# Patient Record
Sex: MALE | Race: WHITE | NOT HISPANIC OR LATINO | Employment: OTHER | URBAN - METROPOLITAN AREA
[De-identification: names, ages, dates, MRNs, and addresses within clinical notes are randomized per-mention and may not be internally consistent; named-entity substitution may affect disease eponyms.]

---

## 2017-03-08 RX ORDER — ATORVASTATIN CALCIUM 20 MG/1
TABLET, FILM COATED ORAL
Qty: 90 TABLET | Refills: 0 | Status: SHIPPED | OUTPATIENT
Start: 2017-03-08 | End: 2017-06-03 | Stop reason: SDUPTHER

## 2017-06-05 RX ORDER — ATORVASTATIN CALCIUM 20 MG/1
TABLET, FILM COATED ORAL
Qty: 30 TABLET | Refills: 0 | Status: SHIPPED | OUTPATIENT
Start: 2017-06-05 | End: 2017-07-05 | Stop reason: SDUPTHER

## 2017-06-27 ENCOUNTER — TELEPHONE (OUTPATIENT)
Dept: CARDIOLOGY | Facility: CLINIC | Age: 57
End: 2017-06-27

## 2017-06-28 DIAGNOSIS — E78.5 DYSLIPIDEMIA: Primary | ICD-10-CM

## 2017-07-05 RX ORDER — ATORVASTATIN CALCIUM 20 MG/1
TABLET, FILM COATED ORAL
Qty: 30 TABLET | Refills: 0 | Status: SHIPPED | OUTPATIENT
Start: 2017-07-05 | End: 2017-08-02 | Stop reason: SDUPTHER

## 2017-07-06 ENCOUNTER — LAB (OUTPATIENT)
Dept: LAB | Facility: HOSPITAL | Age: 57
End: 2017-07-06

## 2017-07-06 DIAGNOSIS — E78.5 DYSLIPIDEMIA: ICD-10-CM

## 2017-07-06 LAB
ALBUMIN SERPL-MCNC: 4.4 G/DL (ref 3.5–5.2)
ALP SERPL-CCNC: 106 U/L (ref 39–117)
ALT SERPL W P-5'-P-CCNC: 30 U/L (ref 1–41)
AST SERPL-CCNC: 26 U/L (ref 1–40)
BILIRUB CONJ SERPL-MCNC: <0.2 MG/DL (ref 0–0.3)
BILIRUB INDIRECT SERPL-MCNC: NORMAL MG/DL
BILIRUB SERPL-MCNC: 0.6 MG/DL (ref 0.1–1.2)
CHOLEST SERPL-MCNC: 135 MG/DL (ref 0–200)
HDLC SERPL-MCNC: 31 MG/DL (ref 40–60)
LDLC SERPL CALC-MCNC: 86 MG/DL (ref 0–100)
LDLC/HDLC SERPL: 2.76 {RATIO}
PROT SERPL-MCNC: 7 G/DL (ref 6–8.5)
TRIGL SERPL-MCNC: 92 MG/DL (ref 0–150)
VLDLC SERPL-MCNC: 18.4 MG/DL (ref 5–40)

## 2017-07-06 PROCEDURE — 80061 LIPID PANEL: CPT | Performed by: INTERNAL MEDICINE

## 2017-07-06 PROCEDURE — 80076 HEPATIC FUNCTION PANEL: CPT

## 2017-07-06 PROCEDURE — 36415 COLL VENOUS BLD VENIPUNCTURE: CPT

## 2017-08-02 RX ORDER — ATORVASTATIN CALCIUM 20 MG/1
TABLET, FILM COATED ORAL
Qty: 30 TABLET | Refills: 2 | Status: SHIPPED | OUTPATIENT
Start: 2017-08-02 | End: 2017-10-30 | Stop reason: SDUPTHER

## 2017-08-21 ENCOUNTER — OFFICE VISIT (OUTPATIENT)
Dept: ORTHOPEDIC SURGERY | Facility: CLINIC | Age: 57
End: 2017-08-21

## 2017-08-21 VITALS — TEMPERATURE: 98.5 F | HEIGHT: 68 IN

## 2017-08-21 DIAGNOSIS — M19.011 OSTEOARTHROSIS, LOCALIZED, PRIMARY, SHOULDER REGION, RIGHT: ICD-10-CM

## 2017-08-21 DIAGNOSIS — G89.29 CHRONIC PAIN OF RIGHT KNEE: Primary | ICD-10-CM

## 2017-08-21 DIAGNOSIS — M25.561 CHRONIC PAIN OF RIGHT KNEE: Primary | ICD-10-CM

## 2017-08-21 PROCEDURE — 73562 X-RAY EXAM OF KNEE 3: CPT | Performed by: ORTHOPAEDIC SURGERY

## 2017-08-21 PROCEDURE — 99203 OFFICE O/P NEW LOW 30 MIN: CPT | Performed by: ORTHOPAEDIC SURGERY

## 2017-08-21 RX ORDER — MELOXICAM 15 MG/1
TABLET ORAL
Qty: 30 TABLET | Refills: 3 | Status: SHIPPED | OUTPATIENT
Start: 2017-08-21 | End: 2017-11-01

## 2017-08-21 NOTE — PROGRESS NOTES
New Right Knee      Patient: Mega Mckeon        YOB: 1960    Medical Record Number: [id number]        Chief Complaints: Right Knee Pain  Chief Complaint   Patient presents with   • Right Knee - Establish Care           History of Present Illness: This is a  57 y.o. male who presents complaining of right knee pain.  He states his knees and bilateral him for a long time ago intermittently had a 3-4 days ago noticed the onset of significant swelling no real history injury change in activity that he can recall his symptoms are constant and moderate to severe with grinding aching clicking popping snapping.  He is self-employed past medical history is remarkable for cardiac disease        Allergies: No Known Allergies    Medications:   Home Medications:  Current Outpatient Prescriptions on File Prior to Visit   Medication Sig   • aspirin 325 MG tablet Take  by mouth daily.   • atorvastatin (LIPITOR) 20 MG tablet TAKE 1 TABLET BY MOUTH AT BEDTIME   • metoprolol tartrate (LOPRESSOR) 25 MG tablet TAKE 1/2 TABLET BY MOUTH DAILY   • ondansetron ODT (ZOFRAN ODT) 8 MG disintegrating tablet Take 1 tablet by mouth every 8 (eight) hours as needed for nausea or vomiting.   • oxyCODONE-acetaminophen (PERCOCET) 7.5-325 MG per tablet Take 1 tablet by mouth every 6 (six) hours as needed for moderate pain (4-6).   • tamsulosin (FLOMAX) 0.4 MG capsule 24 hr capsule Take 1 capsule by mouth daily.     No current facility-administered medications on file prior to visit.      Current Medications:  Scheduled Meds:  Continuous Infusions:  No current facility-administered medications for this visit.   PRN Meds:.    Past Medical History:   Diagnosis Date   • Arthritis    • CAD (coronary artery disease)    • Hyperlipidemia    • Ischemic cardiomyopathy    • Kidney stone    • Myocardial infarction     status: post drug eluting stent to the right coronary artery        Past Surgical History:   Procedure Laterality Date   •  "CORONARY ANGIOPLASTY WITH STENT PLACEMENT      RCA        Social History     Occupational History   • Not on file.     Social History Main Topics   • Smoking status: Never Smoker   • Smokeless tobacco: Not on file   • Alcohol use No   • Drug use: No   • Sexual activity: Defer    Social History     Social History Narrative        Family History   Problem Relation Age of Onset   • Diabetes Father              Review of Systems: 14 point review of systems are remarkable for the pertinent positives listed in the chart by the patient the remainder are negative    Review of Systems      Physical Exam: 57 y.o. male  General Appearance:    Alert, cooperative, in no acute distress                 Vitals:    08/21/17 1233   Temp: 98.5 °F (36.9 °C)   TempSrc: Temporal Artery    Height: 68\" (172.7 cm)      Patient is alert and read ×3 no acute distress appears her above-listed at height weight and age.  Affect is normal respiratory rate is normal unlabored. Heart rate regular rate rhythm, sclera, dentition and hearing are normal for the purpose of this exam.        Ortho Exam Physical exam of the right knee reveals no effusion, no erythema.  The patient has no palpable tenderness along the medial joint line, no tenderness about the lateral joint line.  Patient does have crepitus with patellofemoral range of motion.  They also have subjective symptoms anteriorly during exam.  The patient has a negative bounce home, negative Pawel and a stable ligamentous exam.  Quad tone is reasonable and symmetric.  There are no overlying skin changes no lymphedema no lymphadenopathy.  There is good hip range of motion which is full symmetric and asymptomatic and a normal ankle exam.  Hamstrings and IT band are tight bilaterally.        Procedures             Radiology:   AP, Lateral and merchant views of the right knee  were ordered/reviewed to evauateknee pain.  I've no comparative films.  Patella alter bilaterally and slightly lateral " riding patella with degenerative changes seen on lateral aspect patellofemoral joint.  His good manus were joint space medially and laterally      Assessment/Plan:  Right knee pain and swelling I really think this is more patellofemoral we talked about options we talked about injection he doesn't want to do that now.  We will start Mobic with strict precautions I will put him in a J brace talked about activities to avoid.  He fails to improve we will consider an injection in the future                                Thank you

## 2017-10-30 RX ORDER — ATORVASTATIN CALCIUM 20 MG/1
TABLET, FILM COATED ORAL
Qty: 30 TABLET | Refills: 0 | Status: SHIPPED | OUTPATIENT
Start: 2017-10-30 | End: 2017-11-28 | Stop reason: SDUPTHER

## 2017-11-01 ENCOUNTER — OFFICE VISIT (OUTPATIENT)
Dept: CARDIOLOGY | Facility: CLINIC | Age: 57
End: 2017-11-01

## 2017-11-01 VITALS
HEART RATE: 59 BPM | BODY MASS INDEX: 26.48 KG/M2 | WEIGHT: 185 LBS | HEIGHT: 70 IN | DIASTOLIC BLOOD PRESSURE: 78 MMHG | SYSTOLIC BLOOD PRESSURE: 140 MMHG

## 2017-11-01 DIAGNOSIS — I25.10 CORONARY ARTERY DISEASE INVOLVING NATIVE CORONARY ARTERY OF NATIVE HEART WITHOUT ANGINA PECTORIS: Primary | ICD-10-CM

## 2017-11-01 DIAGNOSIS — E78.2 MIXED HYPERLIPIDEMIA: ICD-10-CM

## 2017-11-01 PROCEDURE — 99214 OFFICE O/P EST MOD 30 MIN: CPT | Performed by: INTERNAL MEDICINE

## 2017-11-01 PROCEDURE — 93000 ELECTROCARDIOGRAM COMPLETE: CPT | Performed by: INTERNAL MEDICINE

## 2017-11-01 NOTE — PROGRESS NOTES
Date of Office Visit: 2017  Encounter Provider: Burak Carcamo MD  Place of Service: [facility] CARDIOLOGY  Patient Name: Mega Mckeon  :1960      Chief Complaint   Patient presents with   • Ischemic heart disease     History of Present Illness    The patient is a 57-year-old white male with a history of coronary artery disease.  He experienced an inferior wall myocardial infarction approximately 6 years ago.  A drug-eluting stent was placed to the right coronary artery.  He does not have any complaints of angina pectoris.  He denies lightheadedness, dizziness, orthopnea nor paroxysmal nocturnal dyspnea.  He has a history of hyperlipidemia as well he recently had his blood work done which essentially is unchanged from previous other than his LDL is a little higher in the 80s.  His liver functions however were normal.    Past Medical History:   Diagnosis Date   • Arthritis    • CAD (coronary artery disease)    • Hyperlipidemia    • Ischemic cardiomyopathy    • Kidney stone    • Myocardial infarction     status: post drug eluting stent to the right coronary artery         Past Surgical History:   Procedure Laterality Date   • CORONARY ANGIOPLASTY WITH STENT PLACEMENT      RCA           Current Outpatient Prescriptions:   •  aspirin 325 MG tablet, Take  by mouth daily., Disp: , Rfl:   •  atorvastatin (LIPITOR) 20 MG tablet, TAKE 1 TABLET BY MOUTH AT BEDTIME, Disp: 30 tablet, Rfl: 0  •  metoprolol tartrate (LOPRESSOR) 25 MG tablet, TAKE 1/2 TABLET BY MOUTH DAILY, Disp: 45 tablet, Rfl: 0      Social History     Social History   • Marital status:      Spouse name: N/A   • Number of children: N/A   • Years of education: N/A     Occupational History   • Not on file.     Social History Main Topics   • Smoking status: Never Smoker   • Smokeless tobacco: Not on file   • Alcohol use No   • Drug use: No   • Sexual activity: Defer     Other Topics Concern   • Not on  "file     Social History Narrative         Review of Systems   Constitution: Negative.   HENT: Negative.    Eyes: Negative.    Cardiovascular: Negative.    Respiratory: Negative.    Endocrine: Negative.    Skin: Negative.    Musculoskeletal: Negative.    Gastrointestinal: Negative.    Neurological: Negative.    Psychiatric/Behavioral: Negative.        Procedures      ECG 12 Lead  Date/Time: 11/1/2017 11:31 AM  Performed by: CHING GOLDSTEIN  Authorized by: CHING GOLDSTEIN   Comparison: compared with previous ECG from 4/20/2016  Similar to previous ECG  Rhythm: sinus rhythm  Rate: normal  Conduction: conduction normal  ST Segments: ST segments normal  T Waves: T waves normal  QRS axis: normal                 Objective:    /78  Pulse 59  Ht 70\" (177.8 cm)  Wt 185 lb (83.9 kg)  BMI 26.54 kg/m2        Physical Exam   Constitutional: He is oriented to person, place, and time. He appears well-developed and well-nourished.   HENT:   Head: Normocephalic.   Eyes: Pupils are equal, round, and reactive to light.   Neck: Normal range of motion. No JVD present. Carotid bruit is not present. No thyromegaly present.   Cardiovascular: Normal rate, regular rhythm, S1 normal, S2 normal, normal heart sounds and intact distal pulses.  Exam reveals no gallop and no friction rub.    No murmur heard.  Pulmonary/Chest: Effort normal and breath sounds normal.   Abdominal: Soft. Bowel sounds are normal.   Musculoskeletal: He exhibits no edema.   Neurological: He is alert and oriented to person, place, and time.   Skin: Skin is warm, dry and intact. No erythema.   Psychiatric: He has a normal mood and affect.   Vitals reviewed.          Assessment:       Diagnosis Plan   1. Coronary artery disease involving native coronary artery of native heart without angina pectoris     2. Mixed hyperlipidemia       Coronary Artery Disease  Assessment  • The patient has no angina    Plan  • Lifestyle modifications discussed include regular " exercise and regular monitoring of cholesterol and blood pressure    Subjective - Objective  • There is a history of past MI  • There has been a previous stent procedure using ELY RCA  • Current antiplatelet therapy includes aspirin 81 mg      Hyperlipidemia: Controlled on medical therapy     Plan:       Follow-up in one year

## 2017-11-06 ENCOUNTER — OFFICE VISIT (OUTPATIENT)
Dept: ORTHOPEDIC SURGERY | Facility: CLINIC | Age: 57
End: 2017-11-06

## 2017-11-06 VITALS — HEIGHT: 70 IN | WEIGHT: 185 LBS | TEMPERATURE: 98.5 F | BODY MASS INDEX: 26.48 KG/M2

## 2017-11-06 DIAGNOSIS — G89.29 CHRONIC PAIN OF RIGHT KNEE: Primary | ICD-10-CM

## 2017-11-06 DIAGNOSIS — S83.241D TEAR OF MEDIAL MENISCUS OF RIGHT KNEE, CURRENT, UNSPECIFIED TEAR TYPE, SUBSEQUENT ENCOUNTER: ICD-10-CM

## 2017-11-06 DIAGNOSIS — M25.561 CHRONIC PAIN OF RIGHT KNEE: Primary | ICD-10-CM

## 2017-11-06 PROCEDURE — 99212 OFFICE O/P EST SF 10 MIN: CPT | Performed by: ORTHOPAEDIC SURGERY

## 2017-11-06 NOTE — PROGRESS NOTES
"Knee f/u      Patient: Mega Mckeon        YOB: 1960            Chief Complaints: Knee pain right   History of Present Illness: patient is here follow-up right knee pain.  With done conservative care in the form of anti-inflammatories strengthening stretching and rest as well as ice.  He has had no lasting improvement in his symptoms      Physical Exam: 57 y.o. male  General Appearance:    Alert, cooperative, in no acute distress                   Vitals:    11/06/17 1221   Temp: 98.5 °F (36.9 °C)   TempSrc: Temporal Artery    Weight: 185 lb (83.9 kg)   Height: 70\" (177.8 cm)      Patient is alert and read ×3 no acute distress appears her above-listed at height weight and age.  Affect is normal respiratory rate is normal unlabored. Heart rate regular rate rhythm, sclera, dentition and hearing are normal for the purpose of this exam.  Exam and complaints are unchanged.  Physical exam of the right  knee reveals no effusion no redness.  The patient does have tenderness about the medial l joint line.  No tenderness about the lateral l joint line.  A negative bounce home and a positive l medial Pawel.    Patient has a stable ligamentous exam.  The patient has a negative Lachman and negative anterior drawer and a negative pivot shift.  Quads are reasonable and symmetric bilaterally.  Calf is soft and nontender.  There is no overlying skin changes no lymphedema lymphadenopathy.  Patient has good hip range of motion full symmetric and asymptomatic and a normal ankle exam.  She has good distal pulses and sensation distally is intact        Procedure:  Procedures          Assessment. Persistent knee pain that is failed conservative management      Plan: l.  History proceed with an MRI.  He can call after that test          "

## 2017-11-13 ENCOUNTER — APPOINTMENT (OUTPATIENT)
Dept: MRI IMAGING | Facility: HOSPITAL | Age: 57
End: 2017-11-13
Attending: ORTHOPAEDIC SURGERY

## 2017-11-14 DIAGNOSIS — G89.29 CHRONIC PAIN OF RIGHT KNEE: ICD-10-CM

## 2017-11-14 DIAGNOSIS — M25.561 CHRONIC PAIN OF RIGHT KNEE: ICD-10-CM

## 2017-11-14 DIAGNOSIS — S83.241D TEAR OF MEDIAL MENISCUS OF RIGHT KNEE, CURRENT, UNSPECIFIED TEAR TYPE, SUBSEQUENT ENCOUNTER: ICD-10-CM

## 2017-11-27 ENCOUNTER — OFFICE VISIT (OUTPATIENT)
Dept: ORTHOPEDIC SURGERY | Facility: CLINIC | Age: 57
End: 2017-11-27

## 2017-11-27 VITALS — TEMPERATURE: 98.4 F | BODY MASS INDEX: 27.4 KG/M2 | WEIGHT: 185 LBS | HEIGHT: 69 IN

## 2017-11-27 DIAGNOSIS — M25.561 CHRONIC PAIN OF RIGHT KNEE: Primary | ICD-10-CM

## 2017-11-27 DIAGNOSIS — G89.29 CHRONIC PAIN OF RIGHT KNEE: Primary | ICD-10-CM

## 2017-11-27 PROCEDURE — 20610 DRAIN/INJ JOINT/BURSA W/O US: CPT | Performed by: ORTHOPAEDIC SURGERY

## 2017-11-27 PROCEDURE — 99213 OFFICE O/P EST LOW 20 MIN: CPT | Performed by: ORTHOPAEDIC SURGERY

## 2017-11-27 RX ORDER — BUPIVACAINE HYDROCHLORIDE 5 MG/ML
4 INJECTION, SOLUTION EPIDURAL; INTRACAUDAL
Status: COMPLETED | OUTPATIENT
Start: 2017-11-27 | End: 2017-11-27

## 2017-11-27 RX ORDER — METHYLPREDNISOLONE ACETATE 80 MG/ML
80 INJECTION, SUSPENSION INTRA-ARTICULAR; INTRALESIONAL; INTRAMUSCULAR; SOFT TISSUE
Status: COMPLETED | OUTPATIENT
Start: 2017-11-27 | End: 2017-11-27

## 2017-11-27 RX ORDER — MELOXICAM 15 MG/1
TABLET ORAL
Refills: 3 | COMMUNITY
Start: 2017-11-17 | End: 2018-11-05

## 2017-11-27 RX ADMIN — BUPIVACAINE HYDROCHLORIDE 4 ML: 5 INJECTION, SOLUTION EPIDURAL; INTRACAUDAL at 13:37

## 2017-11-27 RX ADMIN — METHYLPREDNISOLONE ACETATE 80 MG: 80 INJECTION, SUSPENSION INTRA-ARTICULAR; INTRALESIONAL; INTRAMUSCULAR; SOFT TISSUE at 13:37

## 2017-11-27 NOTE — PROGRESS NOTES
"Knee MRI Follow Up      Patient: Mega Mckeon        YOB: 1960            Chief Complaints: Right Knee Pain.      History of Present Illness: The patient is here follow-up of an MRI of the knee MRI shows some degenerative degenerative changes in the patellofemoral joint no obvious meniscus tear still has some locking and catching      Physical Exam: 57 y.o. male  General Appearance:    Alert, cooperative, in no acute distress                   Vitals:    11/27/17 1317   Temp: 98.4 °F (36.9 °C)   Weight: 185 lb (83.9 kg)   Height: 69\" (175.3 cm)        Patient is alert and read ×3 no acute distress appears her above-listed at height weight and age.  Affect is normal respiratory rate is normal unlabored. Heart rate regular rate rhythm, sclera, dentition and hearing are normal for the purpose of this exam.      Ortho Exam  Physical exam of the right knee reveals no effusion, no erythema.  The patient has no palpable tenderness along the medial joint line, no tenderness about the lateral joint line.  Patient does have crepitus with patellofemoral range of motion.  They also have subjective symptoms anteriorly during exam.  The patient has a negative bounce home, negative Pawel and a stable ligamentous exam.  Quad tone is reasonable and symmetric.  There are no overlying skin changes no lymphedema no lymphadenopathy.  There is good hip range of motion which is full symmetric and asymptomatic and a normal ankle exam.  Hamstrings and IT band are tight bilaterally.        MRI Results are as above some patellofemoral OA I have reviewed the images and agree with the findings      :Large Joint Arthrocentesis  Date/Time: 11/27/2017 1:37 PM  Consent given by: patient  Site marked: site marked  Timeout: Immediately prior to procedure a time out was called to verify the correct patient, procedure, equipment, support staff and site/side marked as required   Supporting Documentation  Indications: pain "   Procedure Details  Location: knee - R knee  Needle size: 25 G  Approach: anteromedial  Medications administered: 80 mg methylPREDNISolone acetate 80 MG/ML; 4 mL bupivacaine (PF) 0.5 %  Patient tolerance: patient tolerated the procedure well with no immediate complications            Assessment/Plan:       right knee pain I still think this is more patellofemoral think is from his arthritis plan is proceed with an injection he fails to improve with that we will consider an arthroscopic evaluation

## 2017-11-28 RX ORDER — ATORVASTATIN CALCIUM 20 MG/1
TABLET, FILM COATED ORAL
Qty: 30 TABLET | Refills: 5 | Status: SHIPPED | OUTPATIENT
Start: 2017-11-28 | End: 2018-01-09 | Stop reason: SDUPTHER

## 2017-12-18 RX ORDER — MELOXICAM 15 MG/1
TABLET ORAL
Qty: 30 TABLET | Refills: 0 | Status: SHIPPED | OUTPATIENT
Start: 2017-12-18 | End: 2018-11-05

## 2018-01-09 RX ORDER — ATORVASTATIN CALCIUM 20 MG/1
20 TABLET, FILM COATED ORAL NIGHTLY
Qty: 90 TABLET | Refills: 1 | Status: SHIPPED | OUTPATIENT
Start: 2018-01-09 | End: 2018-06-21 | Stop reason: SDUPTHER

## 2018-06-21 RX ORDER — ATORVASTATIN CALCIUM 20 MG/1
TABLET, FILM COATED ORAL
Qty: 90 TABLET | Refills: 0 | Status: SHIPPED | OUTPATIENT
Start: 2018-06-21 | End: 2018-09-20 | Stop reason: SDUPTHER

## 2018-06-21 NOTE — TELEPHONE ENCOUNTER
Can you please place an order for Lipid panel & LFT, patient is due for lab work for his atorvastatin. Thanks. dmk

## 2018-09-20 RX ORDER — ATORVASTATIN CALCIUM 20 MG/1
20 TABLET, FILM COATED ORAL NIGHTLY
Qty: 90 TABLET | Refills: 0 | Status: SHIPPED | OUTPATIENT
Start: 2018-09-20 | End: 2018-11-06 | Stop reason: DRUGHIGH

## 2018-11-04 PROBLEM — M19.049 OSTEOARTHRITIS OF HAND: Status: ACTIVE | Noted: 2018-11-04

## 2018-11-04 PROBLEM — E78.5 HYPERLIPIDEMIA: Status: ACTIVE | Noted: 2018-11-04

## 2018-11-04 PROBLEM — I25.5 GENERALIZED ISCHEMIC MYOCARDIAL DYSFUNCTION: Status: ACTIVE | Noted: 2018-11-04

## 2018-11-04 PROBLEM — R07.9 CHEST PAIN: Status: ACTIVE | Noted: 2018-11-04

## 2018-11-04 PROBLEM — I25.10 CHRONIC CORONARY ARTERY DISEASE: Status: ACTIVE | Noted: 2018-11-04

## 2018-11-04 PROBLEM — M25.539 ARTHRALGIA OF WRIST: Status: ACTIVE | Noted: 2018-11-04

## 2018-11-04 PROBLEM — I25.10 CORONARY ARTERY DISEASE: Status: ACTIVE | Noted: 2018-11-04

## 2018-11-04 PROBLEM — R07.89 ATYPICAL CHEST PAIN: Status: ACTIVE | Noted: 2018-11-04

## 2018-11-04 NOTE — PROGRESS NOTES
Date of Office Visit: 2018  Encounter Provider: DEEJAY Pryor  Place of Service: [facility] CARDIOLOGY  Patient Name: Mega Mckeon  :1960      Subjective:     Chief Complaint:  CAD, Ischemic Cardiomyopathy    History of Present Illness:  Mega Mckeon is a pleasant 58 y.o. male who is new to me.  Outside records have been obtained and reviewed by me. The patient has a past medical history as outlined below.    The patient was last seen in the office on 17 by Dr. Carcamo. He has a prior cardiac history of an inferior MI with drug eluting stent placement to the RCA around . At his last visit with Dr. Carcamo he was not having any complaints of angina, lightheadedness, dizziness, orthopnea, or PND. It was reported that his recent lipid panels were essentially unchanged aside from his LDL was slightly higher from previous- with readings in the 80s. His blood pressure was reasonably controlled as well. Dr. Carcamo did not make any medication changes and continued him on aspirin 81 mg and advised that he follow back up in 1 year. It was reported at his 2016 visit with Dr. 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 It was reported that the patient would need an echocardiogram at that time, but I do not see any results for this.     He is here today for his yearly follow up.  Overall he is doing well.  He denies any chest pain, shortness of breath, palpitations, edema, dizziness, lightheadedness, syncope, near-syncope, PND or orthopnea.  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  He does not have any symptoms at work.  He does get a little fatigued with work but nothing that is limiting to him.  He also reports some occasional heartburn that is worse when he drinks more Mountain Dew.  He reports it definitely improves if he  limits his caffeine intake.  He does not check his blood pressure or heart rate at home, but his blood pressure is well controlled today at 118/64.  He reportshe just takes 12.5 mg of metoprolol tartrate daily.  He was taking 25 mg of metoprolol tartrate daily before was cut in half.  He has been doing this for several years and has never taken it twice daily.  He has not had a recent lipid panel, although one was ordered by our office in June 2018. He does not have a PCP.  He states he has tried several in the past and always has to wait a long time and is never found one that he likes.  He does follow with urology yearly and has had a screening colonoscopy last year.         Past Medical History:   Diagnosis Date   • Arthritis    • CAD (coronary artery disease)    • Hyperlipidemia    • Ischemic cardiomyopathy    • Kidney stone    • Myocardial infarction (CMS/HCC)     status: post drug eluting stent to the right coronary artery     Past Surgical History:   Procedure Laterality Date   • CORONARY ANGIOPLASTY WITH STENT PLACEMENT      RCA     Outpatient Medications Prior to Visit   Medication Sig Dispense Refill   • atorvastatin (LIPITOR) 20 MG tablet Take 1 tablet by mouth Every Night. NEEDS CHOLESTEROL LABS 90 tablet 0   • metoprolol tartrate (LOPRESSOR) 25 MG tablet TAKE ONE-HALF (1/2) TABLET DAILY 45 tablet 1   • aspirin 325 MG tablet Take  by mouth daily.     • meloxicam (MOBIC) 15 MG tablet TK 1 T PO D WF  3   • meloxicam (MOBIC) 15 MG tablet TAKE 1 TABLET BY MOUTH DAILY WITH FOOD 30 tablet 0   • metoprolol tartrate (LOPRESSOR) 25 MG tablet TAKE 1/2 TABLET BY MOUTH DAILY 45 tablet 2     No facility-administered medications prior to visit.        Allergies as of 11/05/2018   • (No Known Allergies)     Social History     Social History   • Marital status:      Spouse name: N/A   • Number of children: N/A   • Years of education: N/A     Occupational History   • Not on file.     Social History Main Topics   •  "Smoking status: Never Smoker   • Smokeless tobacco: Never Used   • Alcohol use No   • Drug use: No   • Sexual activity: Defer     Other Topics Concern   • Not on file     Social History Narrative   • No narrative on file     Family History   Problem Relation Age of Onset   • Diabetes Father      Review of Systems   Constitution: Negative for chills, fever and malaise/fatigue.   HENT: Negative for ear pain, hearing loss, nosebleeds and sore throat.    Eyes: Negative for double vision, pain, vision loss in left eye and vision loss in right eye.   Cardiovascular: Negative for chest pain, claudication, dyspnea on exertion, irregular heartbeat, leg swelling, near-syncope, orthopnea, palpitations, paroxysmal nocturnal dyspnea and syncope.   Respiratory: Negative for cough, shortness of breath, snoring and wheezing.    Endocrine: Negative for cold intolerance and heat intolerance.   Hematologic/Lymphatic: Negative for bleeding problem.   Skin: Negative for color change, itching, rash and unusual hair distribution.   Musculoskeletal: Positive for joint pain. Negative for joint swelling.   Gastrointestinal: Negative for abdominal pain, diarrhea, hematochezia, melena, nausea and vomiting.   Genitourinary: Negative for decreased libido, frequency, hematuria, hesitancy and incomplete emptying.   Neurological: Negative for excessive daytime sleepiness, dizziness, headaches, light-headedness, loss of balance, numbness, paresthesias and seizures.   Psychiatric/Behavioral: Negative for depression.          Objective:     Vitals:    11/05/18 0846   BP: 118/64   BP Location: Right arm   Patient Position: Sitting   Pulse: 56   Weight: 82.6 kg (182 lb)   Height: 175.3 cm (69\")     Body mass index is 26.88 kg/m².    PHYSICAL EXAM:  Physical Exam   Constitutional: He is oriented to person, place, and time. He appears well-developed and well-nourished. No distress.   Overweight   HENT:   Head: Normocephalic and atraumatic.   Eyes: Pupils " are equal, round, and reactive to light.   Neck: Phonation normal. Neck supple. No JVD present. Carotid bruit is not present.   Cardiovascular: Regular rhythm, normal heart sounds and intact distal pulses.  Bradycardia present.    No murmur heard.  Pulses:       Radial pulses are 2+ on the right side, and 2+ on the left side.        Dorsalis pedis pulses are 2+ on the right side, and 2+ on the left side.        Posterior tibial pulses are 2+ on the right side, and 2+ on the left side.   Pulmonary/Chest: Effort normal and breath sounds normal. No accessory muscle usage. No respiratory distress. He has no decreased breath sounds. He has no wheezes. He has no rhonchi. He has no rales.   Abdominal: Soft. Bowel sounds are normal. He exhibits no distension. There is no splenomegaly or hepatomegaly. There is no tenderness.   Musculoskeletal: Normal range of motion. He exhibits no edema.   Neurological: He is alert and oriented to person, place, and time.   Skin: Skin is warm, dry and intact. He is not diaphoretic. No erythema.   Psychiatric: He has a normal mood and affect. His speech is normal and behavior is normal. Judgment and thought content normal. Cognition and memory are normal.   Nursing note and vitals reviewed.        ECG 12 Lead  Date/Time: 11/5/2018 9:04 AM  Performed by: ANNELIESE SANTANA  Authorized by: ANNELIESE SANTANA   Comparison: compared with previous ECG from 11/1/2017  Similar to previous ECG  Comparison to previous ECG: New isolated Q wave in lead III  Rhythm: sinus bradycardia  Rate: bradycardic  BPM: 59  T flattening: III  QRS axis: normal  Comments: Otherwise normal ECG  Indication: CAD            Assessment:       Diagnosis Plan   1. Coronary artery disease involving native coronary artery of native heart without angina pectoris  ECG 12 Lead    Lipid Panel    Hepatic Function Panel   2. Other hyperlipidemia  ECG 12 Lead    Lipid Panel    Hepatic Function Panel       Plan:     1.  Coronary Artery  Disease  Plan  • Lifestyle modifications discussed include adhering to a heart healthy diet, avoidance of tobacco products, maintenance of a healthy weight, medication compliance, regular exercise and regular monitoring of cholesterol and blood pressure    Subjective - Objective  • There is a history of past MI  • There has been a previous stent procedure using ELY      The patient has no angina or heart failure symptoms.  He is on aspirin 325 mg daily.  I will have him take aspirin 81 mg daily instead.  He is also taking metoprolol tartrate 12.5 mg daily.  He has never been on twice a day dosing.  I discussed with Dr. Carcamo and decided that he is fine to continue daily dosing since he is doing well with that for several years now, will go to BID dosing in the future if needed.  He does not check his HR or b/p at home.  I advised him to get a blood pressure cuff and it periodically check his blood pressures/HR at home.  He demonstrated understanding. I do not think at this time the patient needs any additional cardiac testing.     2. Hyperlipidemia: On Atorvastatin 20 mg daily. If his LDL continues to stay above 80 with his next lipid panel, would recommend increasing his Atorvastatin to 40 mg daily if his  LFTs remain WNL. An order was given to him June 2018 for a fasting lipid panel. I do not see where this was complete.  I gave the patient a new order for fasting lipid panel and hepatic function panel to be completed in the next couple weeks. I advised the patient that if they have not heard from our office within 2-3 days after they have completed their testing to please call our office to obtain their results.      I advised on the importance of good blood pressure, blood sugar and cholesterol control, as well as regular exercise and weight loss and avoidance of tobacco for cardiovascular disease risk factor modification.  I also advised him on the importance of Seeing care with a primary care physician.  I  gave him a list of [facility] providers broken down by area of [facility]. He demonstrated understanding.           Follow up with Dr. Carcamo in 1 year, unless otherwise needed sooner.  I advised the patient to contact our office with any questions or concerns.         Your medication list          Accurate as of 11/5/18  9:33 AM. If you have any questions, ask your nurse or doctor.               CHANGE how you take these medications      Instructions Last Dose Given Next Dose Due   aspirin 81 MG tablet  What changed:  · medication strength  · how much to take  Changed by:  DEEJAY Pryor      Take 1 tablet by mouth Daily.       metoprolol tartrate 25 MG tablet  Commonly known as:  LOPRESSOR  What changed:  Another medication with the same name was removed. Continue taking this medication, and follow the directions you see here.  Changed by:  DEEJAY Pryor      TAKE ONE-HALF (1/2) TABLET DAILY          CONTINUE taking these medications      Instructions Last Dose Given Next Dose Due   atorvastatin 20 MG tablet  Commonly known as:  LIPITOR      Take 1 tablet by mouth Every Night. NEEDS CHOLESTEROL LABS          STOP taking these medications    meloxicam 15 MG tablet  Commonly known as:  MOBIC  Stopped by:  DEEJAY Pryor              Where to Get Your Medications      Information about where to get these medications is not yet available    Ask your nurse or doctor about these medications  · aspirin 81 MG tablet         The above medication changes may not have been made by this provider.  Medication list was updated to reflect medications patient is currently taking including medication changes and discontinuations made by other healthcare providers.       I have reviewed this case with Dr. Carcamo. It has been a pleasure to participate in this patient's care. Please feel free to contact me with any questions or concerns.     DEEJAY Pryor  11/05/2018       Dictated utilizing Dragon  Dictation System.

## 2018-11-04 NOTE — PROGRESS NOTES
The patient was last seen in the office on 11/1/17 by Dr. Carcamo. He has a prior cardiac history of an inferior MI with stenting to the RCA

## 2018-11-05 ENCOUNTER — OFFICE VISIT (OUTPATIENT)
Dept: CARDIOLOGY | Facility: CLINIC | Age: 58
End: 2018-11-05

## 2018-11-05 VITALS
HEIGHT: 69 IN | HEART RATE: 56 BPM | DIASTOLIC BLOOD PRESSURE: 64 MMHG | WEIGHT: 182 LBS | SYSTOLIC BLOOD PRESSURE: 118 MMHG | BODY MASS INDEX: 26.96 KG/M2

## 2018-11-05 DIAGNOSIS — E78.49 OTHER HYPERLIPIDEMIA: ICD-10-CM

## 2018-11-05 DIAGNOSIS — I25.10 CORONARY ARTERY DISEASE INVOLVING NATIVE CORONARY ARTERY OF NATIVE HEART WITHOUT ANGINA PECTORIS: Primary | ICD-10-CM

## 2018-11-05 PROCEDURE — 93000 ELECTROCARDIOGRAM COMPLETE: CPT | Performed by: NURSE PRACTITIONER

## 2018-11-05 PROCEDURE — 99214 OFFICE O/P EST MOD 30 MIN: CPT | Performed by: NURSE PRACTITIONER

## 2018-11-06 ENCOUNTER — TELEPHONE (OUTPATIENT)
Dept: CARDIOLOGY | Facility: CLINIC | Age: 58
End: 2018-11-06

## 2018-11-06 ENCOUNTER — LAB (OUTPATIENT)
Dept: LAB | Facility: HOSPITAL | Age: 58
End: 2018-11-06

## 2018-11-06 DIAGNOSIS — E78.49 OTHER HYPERLIPIDEMIA: ICD-10-CM

## 2018-11-06 DIAGNOSIS — E78.5 HYPERLIPIDEMIA, UNSPECIFIED HYPERLIPIDEMIA TYPE: Primary | ICD-10-CM

## 2018-11-06 DIAGNOSIS — I25.10 CORONARY ARTERY DISEASE INVOLVING NATIVE CORONARY ARTERY OF NATIVE HEART WITHOUT ANGINA PECTORIS: ICD-10-CM

## 2018-11-06 LAB
ALBUMIN SERPL-MCNC: 4.3 G/DL (ref 3.5–5.2)
ALP SERPL-CCNC: 108 U/L (ref 39–117)
ALT SERPL W P-5'-P-CCNC: 34 U/L (ref 1–41)
AST SERPL-CCNC: 31 U/L (ref 1–40)
BILIRUB CONJ SERPL-MCNC: <0.2 MG/DL (ref 0–0.3)
BILIRUB INDIRECT SERPL-MCNC: NORMAL MG/DL
BILIRUB SERPL-MCNC: 0.8 MG/DL (ref 0.1–1.2)
CHOLEST SERPL-MCNC: 129 MG/DL (ref 0–200)
HDLC SERPL-MCNC: 34 MG/DL (ref 40–60)
LDLC SERPL CALC-MCNC: 81 MG/DL (ref 0–100)
LDLC/HDLC SERPL: 2.39 {RATIO}
PROT SERPL-MCNC: 7.2 G/DL (ref 6–8.5)
TRIGL SERPL-MCNC: 68 MG/DL (ref 0–150)
VLDLC SERPL-MCNC: 13.6 MG/DL (ref 5–40)

## 2018-11-06 PROCEDURE — 80061 LIPID PANEL: CPT

## 2018-11-06 PROCEDURE — 80076 HEPATIC FUNCTION PANEL: CPT

## 2018-11-06 PROCEDURE — 36415 COLL VENOUS BLD VENIPUNCTURE: CPT

## 2018-11-06 RX ORDER — ATORVASTATIN CALCIUM 40 MG/1
40 TABLET, FILM COATED ORAL DAILY
Qty: 30 TABLET | Refills: 2
Start: 2018-11-06 | End: 2018-12-19 | Stop reason: SDUPTHER

## 2018-11-06 NOTE — TELEPHONE ENCOUNTER
I called and spoke with the patient regarding his cholesterol levels.  His LDL is still in the 80s.  I will have him double up on his atorvastatin dose and take a total of 40 mg daily.  He will recheck his fasting lipid panel and hepatic function panel in 3 months.  Currently he does not need any refills of his Atorvastatin.  He states he's been using a mail order pharmacy  andis unsure if it is [facility] as listed in the computer or not.  He reports he will get back to our office before he runs out of the atorvastatin 20 mg tablets so we can refill it with the atorvastatin 40 mg.

## 2018-11-13 ENCOUNTER — TELEPHONE (OUTPATIENT)
Dept: CARDIOLOGY | Facility: CLINIC | Age: 58
End: 2018-11-13

## 2018-11-13 NOTE — TELEPHONE ENCOUNTER
He called just to let you know he does use [facility].    Pt Number: [phone]    Thank you   Karolyn

## 2018-12-19 RX ORDER — ATORVASTATIN CALCIUM 40 MG/1
40 TABLET, FILM COATED ORAL DAILY
Qty: 90 TABLET | Refills: 3 | Status: SHIPPED | OUTPATIENT
Start: 2018-12-19 | End: 2020-01-13

## 2019-01-02 ENCOUNTER — HOSPITAL ENCOUNTER (OUTPATIENT)
Dept: GENERAL RADIOLOGY | Facility: HOSPITAL | Age: 59
Discharge: HOME | End: 2019-01-02
Attending: UROLOGY | Admitting: UROLOGY

## 2019-01-02 DIAGNOSIS — N13.8 BPH WITH OBSTRUCTION/LOWER URINARY TRACT SYMPTOMS: ICD-10-CM

## 2019-01-02 DIAGNOSIS — N40.1 BPH WITH OBSTRUCTION/LOWER URINARY TRACT SYMPTOMS: ICD-10-CM

## 2019-01-02 PROCEDURE — 74018 RADEX ABDOMEN 1 VIEW: CPT

## 2019-02-27 DIAGNOSIS — E78.5 HYPERLIPIDEMIA, UNSPECIFIED HYPERLIPIDEMIA TYPE: Primary | ICD-10-CM

## 2019-10-09 ENCOUNTER — OFFICE VISIT (OUTPATIENT)
Dept: CARDIOLOGY | Facility: CLINIC | Age: 59
End: 2019-10-09

## 2019-10-09 ENCOUNTER — LAB (OUTPATIENT)
Dept: LAB | Facility: HOSPITAL | Age: 59
End: 2019-10-09

## 2019-10-09 VITALS
HEART RATE: 55 BPM | WEIGHT: 182.6 LBS | SYSTOLIC BLOOD PRESSURE: 124 MMHG | OXYGEN SATURATION: 99 % | DIASTOLIC BLOOD PRESSURE: 86 MMHG | BODY MASS INDEX: 26.14 KG/M2 | HEIGHT: 70 IN

## 2019-10-09 DIAGNOSIS — I25.10 CORONARY ARTERY DISEASE INVOLVING NATIVE CORONARY ARTERY OF NATIVE HEART WITHOUT ANGINA PECTORIS: Primary | ICD-10-CM

## 2019-10-09 DIAGNOSIS — E78.49 OTHER HYPERLIPIDEMIA: ICD-10-CM

## 2019-10-09 LAB
ALBUMIN SERPL-MCNC: 4.7 G/DL (ref 3.5–5.2)
ALP SERPL-CCNC: 110 U/L (ref 39–117)
ALT SERPL W P-5'-P-CCNC: 33 U/L (ref 1–41)
AST SERPL-CCNC: 27 U/L (ref 1–40)
BILIRUB CONJ SERPL-MCNC: 0.2 MG/DL (ref 0.2–0.3)
BILIRUB INDIRECT SERPL-MCNC: 0.5 MG/DL
BILIRUB SERPL-MCNC: 0.7 MG/DL (ref 0.2–1.2)
CHOLEST SERPL-MCNC: 123 MG/DL (ref 0–200)
HDLC SERPL-MCNC: 32 MG/DL (ref 40–60)
LDLC SERPL CALC-MCNC: 69 MG/DL (ref 0–100)
LDLC/HDLC SERPL: 2.17 {RATIO}
PROT SERPL-MCNC: 7.2 G/DL (ref 6–8.5)
TRIGL SERPL-MCNC: 108 MG/DL (ref 0–150)
VLDLC SERPL-MCNC: 21.6 MG/DL (ref 5–40)

## 2019-10-09 PROCEDURE — 80076 HEPATIC FUNCTION PANEL: CPT

## 2019-10-09 PROCEDURE — 99214 OFFICE O/P EST MOD 30 MIN: CPT | Performed by: INTERNAL MEDICINE

## 2019-10-09 PROCEDURE — 93000 ELECTROCARDIOGRAM COMPLETE: CPT | Performed by: INTERNAL MEDICINE

## 2019-10-09 PROCEDURE — 80061 LIPID PANEL: CPT | Performed by: INTERNAL MEDICINE

## 2019-10-09 PROCEDURE — 36415 COLL VENOUS BLD VENIPUNCTURE: CPT

## 2019-10-09 NOTE — PROGRESS NOTES
Date of Office Visit: 10/09/2019    Patient Name: Mega Mckeon  : 1960    Encounter Provider: Burak Carcamo MD  Referring Provider: Burak Carcamo MD  Place of Service: [facility] CARDIOLOGY  Patient Care Team:  Provider, No Known as PCP - General      Chief Complaint   Patient presents with   • Coronary Artery Disease     1 yr follow up     History of Present Illness    The patient is a 59-year-old white male with a history of coronary artery disease.  He has had a previous inferior myocardial infarction with a drug-eluting stent placed to the right coronary artery.  Left ventricular function is normal.  Last stress test was normal.  Patient reports no symptoms at all at this time.  He denies chest pain, shortness of breath, lightheadedness, dizziness, orthopnea or paroxysmal nocturnal dyspnea.    He remains on statin therapy for hyperlipidemia.  His last check was a year ago.  LDL was 81, HDL was 34.    Past Medical History:   Diagnosis Date   • Arthritis    • CAD (coronary artery disease)    • Hyperlipidemia    • Ischemic cardiomyopathy    • Kidney stone    • Myocardial infarction (CMS/HCC)     status: post drug eluting stent to the right coronary artery         Past Surgical History:   Procedure Laterality Date   • CORONARY ANGIOPLASTY WITH STENT PLACEMENT      RCA           Current Outpatient Medications:   •  aspirin 81 MG tablet, Take 1 tablet by mouth Daily., Disp: 30 tablet, Rfl: 11  •  atorvastatin (LIPITOR) 40 MG tablet, Take 1 tablet by mouth Daily., Disp: 90 tablet, Rfl: 3  •  metoprolol tartrate (LOPRESSOR) 25 MG tablet, Take 0.5 tablets by mouth Daily., Disp: 45 tablet, Rfl: 3      Social History     Socioeconomic History   • Marital status:      Spouse name: Not on file   • Number of children: Not on file   • Years of education: Not on file   • Highest education level: Not on file   Tobacco Use   • Smoking status: Never Smoker   •  "Smokeless tobacco: Never Used   • Tobacco comment: caffeine use   Substance and Sexual Activity   • Alcohol use: No   • Drug use: No   • Sexual activity: Defer         Review of Systems   Constitution: Negative.   HENT: Negative.    Eyes: Negative.    Cardiovascular: Negative.    Respiratory: Negative.    Endocrine: Negative.    Skin: Negative.    Musculoskeletal: Negative.    Gastrointestinal: Negative.    Neurological: Negative.    Psychiatric/Behavioral: Negative.        Procedures      ECG 12 Lead  Date/Time: 10/9/2019 10:29 AM  Performed by: Burak Carcamo MD  Authorized by: Burak Carcamo MD   Comparison: compared with previous ECG from 11/5/2018  Similar to previous ECG  Rhythm: sinus rhythm  Rate: normal  Conduction: conduction normal  ST Segments: ST segments normal  T Waves: T waves normal  QRS axis: normal                  Objective:    /86 (BP Location: Left arm, Patient Position: Sitting, Cuff Size: Adult)   Pulse 55   Ht 177.8 cm (70\")   Wt 82.8 kg (182 lb 9.6 oz)   SpO2 99%   BMI 26.20 kg/m²         Physical Exam   Constitutional: He is oriented to person, place, and time. He appears well-developed and well-nourished.   HENT:   Head: Normocephalic.   Eyes: Pupils are equal, round, and reactive to light.   Neck: Normal range of motion. No JVD present. Carotid bruit is not present. No thyromegaly present.   Cardiovascular: Normal rate, regular rhythm, S1 normal, S2 normal, normal heart sounds and intact distal pulses. Exam reveals no gallop and no friction rub.   No murmur heard.  Pulmonary/Chest: Effort normal and breath sounds normal.   Abdominal: Soft. Bowel sounds are normal.   Musculoskeletal: He exhibits no edema.   Neurological: He is alert and oriented to person, place, and time.   Skin: Skin is warm, dry and intact. No erythema.   Psychiatric: He has a normal mood and affect.   Vitals reviewed.          Assessment:       Diagnosis Plan   1. Coronary artery disease " involving native coronary artery of native heart without angina pectoris     2. Other hyperlipidemia  Lipid Panel    Hepatic Function Panel     1. Coronary Artery Disease  Assessment  • The patient has no angina    Plan  • Lifestyle modifications discussed include adhering to a heart healthy diet, maintenance of a healthy weight and regular exercise    Subjective - Objective  • There is a history of past MI  • There has been a previous stent procedure using ELY RCA  • Current antiplatelet therapy includes aspirin 81 mg      2.  Hyperlipidemia: Recheck lipid panel at this time     Plan:

## 2020-01-13 RX ORDER — ATORVASTATIN CALCIUM 40 MG/1
40 TABLET, FILM COATED ORAL DAILY
Qty: 90 TABLET | Refills: 3 | Status: SHIPPED | OUTPATIENT
Start: 2020-01-13 | End: 2020-11-03

## 2020-03-17 ENCOUNTER — OFFICE VISIT (OUTPATIENT)
Dept: ORTHOPEDIC SURGERY | Facility: CLINIC | Age: 60
End: 2020-03-17

## 2020-03-17 VITALS — TEMPERATURE: 98.1 F | HEIGHT: 69 IN | WEIGHT: 181.4 LBS | BODY MASS INDEX: 26.87 KG/M2

## 2020-03-17 DIAGNOSIS — M79.671 PAIN OF RIGHT HEEL: Primary | ICD-10-CM

## 2020-03-17 DIAGNOSIS — M72.2 PLANTAR FASCIITIS: ICD-10-CM

## 2020-03-17 PROCEDURE — 73650 X-RAY EXAM OF HEEL: CPT | Performed by: ORTHOPAEDIC SURGERY

## 2020-03-17 PROCEDURE — 99213 OFFICE O/P EST LOW 20 MIN: CPT | Performed by: ORTHOPAEDIC SURGERY

## 2020-03-17 RX ORDER — MELOXICAM 15 MG/1
TABLET ORAL
Qty: 30 TABLET | Refills: 0 | Status: SHIPPED | OUTPATIENT
Start: 2020-03-17 | End: 2020-08-24

## 2020-03-17 NOTE — PROGRESS NOTES
New right heel      Patient: Mega Mckeon        YOB: 1960        Chief Complaints: right heel pain      History of Present Illness: This is a 60-year-old male he is Daphne Sunshine's brother who presents complaining of right heel pain is been ongoing a couple of months.  He does state he has been in some different work boots and is not sure if that is what it is he did change his boots again and that has helped he has pain if he is up on pain by the end of the day symptoms are moderate intermittent aching with swelling worse with standing sitting driving walking better with rest he is self-employed past medical history is unremarkable    HPI      Allergies: No Known Allergies    Medications:   Home Medications:  Current Outpatient Medications on File Prior to Visit   Medication Sig   • aspirin 81 MG tablet Take 1 tablet by mouth Daily.   • atorvastatin (LIPITOR) 40 MG tablet TAKE 1 TABLET BY MOUTH DAILY   • metoprolol tartrate (LOPRESSOR) 25 MG tablet TAKE 1/2 TABLET BY MOUTH DAILY     No current facility-administered medications on file prior to visit.      Current Medications:  Scheduled Meds:  Continuous Infusions:  No current facility-administered medications for this visit.   PRN Meds:.    Past Medical History:   Diagnosis Date   • Arthritis    • CAD (coronary artery disease)    • Hyperlipidemia    • Ischemic cardiomyopathy    • Kidney stone    • Myocardial infarction (CMS/HCC)     status: post drug eluting stent to the right coronary artery        Past Surgical History:   Procedure Laterality Date   • CORONARY ANGIOPLASTY WITH STENT PLACEMENT      RCA        Social History     Occupational History   • Not on file   Tobacco Use   • Smoking status: Never Smoker   • Smokeless tobacco: Never Used   • Tobacco comment: caffeine use   Substance and Sexual Activity   • Alcohol use: No   • Drug use: No   • Sexual activity: Defer    Social History     Social History Narrative   • Not on file         Family History   Problem Relation Age of Onset   • Diabetes Father              Review of Systems: 14 point review of systems are remarkable for the heel pain only the remainder negative per the patient    Review of Systems      Physical Exam: 60 y.o. male  General Appearance:    Alert, cooperative, in no acute distress                 There were no vitals filed for this visit.   Patient is alert and read ×3 no acute distress appears her above-listed at height weight and age.  Affect is normal respiratory rate is normal unlabored. Heart rate regular rate rhythm, sclera, dentition and hearing are normal for the purpose of this exam.        Ortho Exam's exam of the right heel he has tenderness over the origin of plantar fascia just posterior to the arch he does have some tightness in his heel cords which is symmetric bilaterally posterior tib is intact no overlying skin changes no swelling           Radiology:   AP, and axial view of the right heel ordered/reviewed to evaluate pain. I have no comparative films these are normal I see no evidence of any acute bony pathology  Assessment/Plan:    Right heel pain I think this is plantar fasciitis which I discussed with her in detail I think stretching is key which I showed him how to do we talked about inserts which he has at home and he has changed his boots.  He will try this if it fails to improve we will pursue dedicated physical therapy if that fails would pursue other means of testing and/or referral to Dr. Marie

## 2020-08-23 PROBLEM — I25.10 CHRONIC CORONARY ARTERY DISEASE: Status: RESOLVED | Noted: 2018-11-04 | Resolved: 2020-08-23

## 2020-08-23 PROBLEM — Z76.89 ENCOUNTER TO ESTABLISH CARE: Status: ACTIVE | Noted: 2020-08-23

## 2020-08-23 PROBLEM — I10 ESSENTIAL HYPERTENSION: Status: ACTIVE | Noted: 2020-08-23

## 2020-08-23 NOTE — PROGRESS NOTES
Name: Mega Mckeon  :  1960    Subjective:      Chief Complaint   Patient presents with   • Establish Care     Pt presents here today to establish care.   • Hypertension   • Hyperlipidemia        Mega Mckeon is a 60 y.o. male patient.  He has multiple chronic medical conditions including:   Hypertension, coronary artery disease (inferior MI ELY R coronary artery ), hyperlipidemia, OA (stable - no longer on nsaids), (prior kidney stone - last FU with [facility] 2020 - negative for stones)     He was a prior patient of Dr Carroll.   He is new to me. He is here to establish care.   No complaints     Hypertension   This is a chronic problem. The current episode started more than 1 year ago. The problem is controlled. Pertinent negatives include no chest pain, headaches, palpitations, peripheral edema or shortness of breath. There are no associated agents to hypertension. Risk factors for coronary artery disease include male gender and dyslipidemia. Past treatments include beta blockers. Current antihypertension treatment includes beta blockers. There are no compliance problems.  Hypertensive end-organ damage includes CAD/MI.   Hyperlipidemia   This is a chronic problem. Episode onset: . The problem is controlled. Recent lipid tests were reviewed and are normal. Factors aggravating his hyperlipidemia include beta blockers. Pertinent negatives include no chest pain or shortness of breath. Current antihyperlipidemic treatment includes statins. The current treatment provides significant improvement of lipids. There are no compliance problems.  Risk factors for coronary artery disease include hypertension, male sex and dyslipidemia.   Coronary Artery Disease   Presents for follow-up (MI  ) visit. Pertinent negatives include no chest pain, leg swelling, palpitations or shortness of breath. Risk factors include hyperlipidemia. The symptoms have been stable. Compliance with  diet is variable. Compliance with exercise is good. Compliance with medications is good.       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       I have reviewed the patient's medical history in detail and updated the computerized patient record.    Past Medical History:   Diagnosis Date   • Arthritis    • CAD (coronary artery disease)    • Hyperlipidemia    • Ischemic cardiomyopathy    • Kidney stone    • Myocardial infarction (CMS/HCC)     status: post drug eluting stent to the right coronary artery       Past Surgical History:   Procedure Laterality Date   • COLONOSCOPY  05/08/2015    Dr Stevens    • CORONARY ANGIOPLASTY WITH STENT PLACEMENT  02/11/2011    [facility]CA       Family History   Problem Relation Age of Onset   • Diabetes Father        Social History     Socioeconomic History   • Marital status:      Spouse name: Not on file   • Number of children: 1   • Years of education: Not on file   • Highest education level: Not on file   Tobacco Use   • Smoking status: Never Smoker   • Smokeless tobacco: Never Used   • Tobacco comment: caffeine use   Substance and Sexual Activity   • Alcohol use: No   • Drug use: No   • Sexual activity: Defer   Social History Narrative    Self employed - works in concrete        There is no immunization history for the selected administration types on file for this patient.      Review of Systems:   Review of Systems   Constitutional: Negative for chills, fever and unexpected weight change.   Eyes: Negative.    Respiratory: Negative for shortness of breath.    Cardiovascular: Negative for chest pain, palpitations and leg swelling.   Gastrointestinal: Negative for abdominal pain, anal bleeding and blood in stool.   Endocrine: Negative.    Genitourinary: Negative for difficulty urinating.   Musculoskeletal: Negative.    Allergic/Immunologic: Negative.    Neurological: Negative for headaches.    Psychiatric/Behavioral: Negative.          Objective:      Physical Exam:   Physical Exam   Constitutional: He is oriented to person, place, and time. He appears well-developed. He is cooperative.   HENT:   Head: Normocephalic and atraumatic.   Right Ear: External ear normal.   Left Ear: External ear normal.   Nose: Nose normal.   Mouth/Throat: Oropharynx is clear and moist and mucous membranes are normal.   Eyes: Pupils are equal, round, and reactive to light. Conjunctivae and EOM are normal.   Neck: Normal range of motion. Neck supple. No JVD present. No thyromegaly present.   Cardiovascular: Normal rate, regular rhythm, S1 normal, S2 normal, normal heart sounds, intact distal pulses and normal pulses. Exam reveals no gallop and no friction rub.   No murmur heard.  Pulses:       Radial pulses are 2+ on the right side, and 2+ on the left side.   Pulmonary/Chest: Effort normal and breath sounds normal. He exhibits no deformity.   Abdominal: Soft. Bowel sounds are normal. There is no hepatosplenomegaly. There is no tenderness. There is negative Cook's sign. No hernia. Hernia confirmed negative in the right inguinal area and confirmed negative in the left inguinal area.   Genitourinary: Testes normal and penis normal. Cremasteric reflex is present. Uncircumcised.   Lymphadenopathy:     He has no cervical adenopathy.     He has no axillary adenopathy.   Neurological: He is alert and oriented to person, place, and time. He has normal strength. No cranial nerve deficit or sensory deficit. He displays a negative Romberg sign.   Skin: Skin is warm, dry and intact. Capillary refill takes 2 to 3 seconds. No cyanosis. Nails show no clubbing.   Psychiatric: He has a normal mood and affect. His speech is normal and behavior is normal. Judgment and thought content normal. Cognition and memory are normal.   Vitals reviewed.        Vital Signs:  Vitals:    08/24/20 0958   BP: 135/80   BP Location: Left arm   Patient Position:  "Sitting   Cuff Size: Adult   Pulse: 53   Resp: 16   Temp: 98.1 °F (36.7 °C)   TempSrc: Oral   SpO2: 98%   Weight: 79.8 kg (176 lb)   Height: 175.3 cm (69\")     Body mass index is 25.99 kg/m².      Results Review:      REVIEWED AND DISCUSSED LAB RESULTS WITH PATIENT      Requested Prescriptions      No prescriptions requested or ordered in this encounter     Routine medications provided by this office will also be refilled via pharmacy request.       Current Outpatient Medications:   •  aspirin 81 MG tablet, Take 1 tablet by mouth Daily., Disp: 30 tablet, Rfl: 11  •  atorvastatin (LIPITOR) 40 MG tablet, TAKE 1 TABLET BY MOUTH DAILY, Disp: 90 tablet, Rfl: 3  •  metoprolol tartrate (LOPRESSOR) 25 MG tablet, TAKE 1/2 TABLET BY MOUTH DAILY, Disp: 45 tablet, Rfl: 3       Assessment and Plan:        Problem List Items Addressed This Visit        Cardiovascular and Mediastinum    Coronary artery disease     Coronary artery disease is improving with treatment.  Continue current treatment regimen.  Dietary sodium restriction.  Regular aerobic exercise.  Cardiac status will be reassessed in 6 months.    Continues ASA 81 mg daily          Relevant Orders    Lipid Panel With LDL / HDL Ratio    Essential hypertension     Hypertension is improving with treatment.  Continue current treatment regimen.  Dietary sodium restriction.  Regular aerobic exercise.  Blood pressure will be reassessed at the next regular appointment.         Relevant Orders    Comprehensive Metabolic Panel    CBC (No Diff)    Hyperlipidemia     Lipid abnormalities are improving with treatment.  Pharmacotherapy as ordered.  Lipids will be reassessed in 6 months.         Relevant Orders    Lipid Panel With LDL / HDL Ratio       Other    Encounter to establish care - Primary      Other Visit Diagnoses     Need for hepatitis C screening test        Relevant Orders    Hepatitis C Antibody    Need for tetanus, diphtheria, and acellular pertussis (Tdap) vaccine     " "   Relevant Orders    Tdap Vaccine Greater Than or Equal To 8yo IM             Discussed any change in Rx and discussed visit with patient.  All questions answered.      Return in about 6 months (around 2/24/2021).    Sp \"Reilly\" Stacia, APRN   08/24/20    Dragon disclaimer:   Much of this encounter note is an electronic transcription/translation of spoken language to printed text. The electronic translation of spoken language may permit erroneous, or at times, nonsensical words or phrases to be inadvertently transcribed; Although I have reviewed the note for such errors, some may still exist.     Additional Patient Counseling:       Patient Instructions   Vaccines:     Shingles vaccine is given in TWO separate injections.   CDC recommends that healthy adults 50 years and older get two doses of the shingles vaccine called Shingrix (recombinant zoster vaccine),  by 2 to 6 months, to prevent shingles and the complications from the disease.       Diet:    • Eat vegetables, fruits, whole grain, low-fat dairy, poultry, fish, beans, nontropical vegetable oils, and nuts, but avoid red meat (i.e., Mediterranean-style diet, DASH [Dietary Approaches to Stop Hypertension] diet).  • Limit sugary drinks and sweets.  • Limit saturated and trans fat to 5% to 6% of calories.  • Limit sodium intake to 2,400 mg daily (about one teaspoon table salt [kosher/sea salt have less sodium per teaspoon]).  Weight loss / Calorie Counting Apps:    • Lose It!   • MyFitness Pal   • Works great when you try it with a partner/ friend  Exercise:   • Engage in moderate-to-vigorous aerobic activity for at least 40 minutes (on average) three to four times each week.  Wearables:   • Activity tracker   • Step tracker   Skin Care:   • Use sun screen SPF >30 daily  • Dermatologist for skin check regularly  Bone Health:   • [email or web address]            "

## 2020-08-24 ENCOUNTER — OFFICE VISIT (OUTPATIENT)
Dept: INTERNAL MEDICINE | Facility: CLINIC | Age: 60
End: 2020-08-24

## 2020-08-24 VITALS
DIASTOLIC BLOOD PRESSURE: 80 MMHG | HEIGHT: 69 IN | BODY MASS INDEX: 26.07 KG/M2 | SYSTOLIC BLOOD PRESSURE: 135 MMHG | HEART RATE: 53 BPM | OXYGEN SATURATION: 98 % | RESPIRATION RATE: 16 BRPM | TEMPERATURE: 98.1 F | WEIGHT: 176 LBS

## 2020-08-24 DIAGNOSIS — Z11.59 NEED FOR HEPATITIS C SCREENING TEST: ICD-10-CM

## 2020-08-24 DIAGNOSIS — Z23 NEED FOR TETANUS, DIPHTHERIA, AND ACELLULAR PERTUSSIS (TDAP) VACCINE: ICD-10-CM

## 2020-08-24 DIAGNOSIS — Z76.89 ENCOUNTER TO ESTABLISH CARE: Primary | ICD-10-CM

## 2020-08-24 DIAGNOSIS — I10 ESSENTIAL HYPERTENSION: ICD-10-CM

## 2020-08-24 DIAGNOSIS — I25.10 CORONARY ARTERY DISEASE INVOLVING NATIVE CORONARY ARTERY OF NATIVE HEART WITHOUT ANGINA PECTORIS: ICD-10-CM

## 2020-08-24 DIAGNOSIS — E78.2 MIXED HYPERLIPIDEMIA: ICD-10-CM

## 2020-08-24 PROCEDURE — 99386 PREV VISIT NEW AGE 40-64: CPT | Performed by: NURSE PRACTITIONER

## 2020-08-24 PROCEDURE — 90471 IMMUNIZATION ADMIN: CPT | Performed by: NURSE PRACTITIONER

## 2020-08-24 PROCEDURE — 90715 TDAP VACCINE 7 YRS/> IM: CPT | Performed by: NURSE PRACTITIONER

## 2020-08-24 NOTE — ASSESSMENT & PLAN NOTE
Coronary artery disease is improving with treatment.  Continue current treatment regimen.  Dietary sodium restriction.  Regular aerobic exercise.  Cardiac status will be reassessed in 6 months.    Continues ASA 81 mg daily

## 2020-08-24 NOTE — PATIENT INSTRUCTIONS
Vaccines:     Shingles vaccine is given in TWO separate injections.   CDC recommends that healthy adults 50 years and older get two doses of the shingles vaccine called Shingrix (recombinant zoster vaccine),  by 2 to 6 months, to prevent shingles and the complications from the disease.       Diet:    • Eat vegetables, fruits, whole grain, low-fat dairy, poultry, fish, beans, nontropical vegetable oils, and nuts, but avoid red meat (i.e., Mediterranean-style diet, DASH [Dietary Approaches to Stop Hypertension] diet).  • Limit sugary drinks and sweets.  • Limit saturated and trans fat to 5% to 6% of calories.  • Limit sodium intake to 2,400 mg daily (about one teaspoon table salt [kosher/sea salt have less sodium per teaspoon]).  Weight loss / Calorie Counting Apps:    • Lose It!   • MyFitness Pal   • Works great when you try it with a partner/ friend  Exercise:   • Engage in moderate-to-vigorous aerobic activity for at least 40 minutes (on average) three to four times each week.  Wearables:   • Activity tracker   • Step tracker   Skin Care:   • Use sun screen SPF >30 daily  • Dermatologist for skin check regularly  Bone Health:   • [email or web address]

## 2020-08-25 LAB
ALBUMIN SERPL-MCNC: 4.5 G/DL (ref 3.8–4.9)
ALBUMIN/GLOB SERPL: 2.1 {RATIO} (ref 1.2–2.2)
ALP SERPL-CCNC: 110 IU/L (ref 39–117)
ALT SERPL-CCNC: 28 IU/L (ref 0–44)
AST SERPL-CCNC: 27 IU/L (ref 0–40)
BILIRUB SERPL-MCNC: 0.6 MG/DL (ref 0–1.2)
BUN SERPL-MCNC: 14 MG/DL (ref 8–27)
BUN/CREAT SERPL: 17 (ref 10–24)
CALCIUM SERPL-MCNC: 9.5 MG/DL (ref 8.6–10.2)
CHLORIDE SERPL-SCNC: 105 MMOL/L (ref 96–106)
CHOLEST SERPL-MCNC: 116 MG/DL (ref 100–199)
CO2 SERPL-SCNC: 23 MMOL/L (ref 20–29)
CREAT SERPL-MCNC: 0.84 MG/DL (ref 0.76–1.27)
ERYTHROCYTE [DISTWIDTH] IN BLOOD BY AUTOMATED COUNT: 12.5 % (ref 11.6–15.4)
GLOBULIN SER CALC-MCNC: 2.1 G/DL (ref 1.5–4.5)
GLUCOSE SERPL-MCNC: 111 MG/DL (ref 65–99)
HCT VFR BLD AUTO: 40.7 % (ref 37.5–51)
HCV AB S/CO SERPL IA: <0.1 S/CO RATIO (ref 0–0.9)
HDLC SERPL-MCNC: 34 MG/DL
HGB BLD-MCNC: 14 G/DL (ref 13–17.7)
LDLC SERPL CALC-MCNC: 61 MG/DL (ref 0–99)
LDLC/HDLC SERPL: 1.8 RATIO (ref 0–3.6)
MCH RBC QN AUTO: 32.2 PG (ref 26.6–33)
MCHC RBC AUTO-ENTMCNC: 34.4 G/DL (ref 31.5–35.7)
MCV RBC AUTO: 94 FL (ref 79–97)
PLATELET # BLD AUTO: 233 X10E3/UL (ref 150–450)
POTASSIUM SERPL-SCNC: 4.6 MMOL/L (ref 3.5–5.2)
PROT SERPL-MCNC: 6.6 G/DL (ref 6–8.5)
RBC # BLD AUTO: 4.35 X10E6/UL (ref 4.14–5.8)
SODIUM SERPL-SCNC: 145 MMOL/L (ref 134–144)
TRIGL SERPL-MCNC: 106 MG/DL (ref 0–149)
VLDLC SERPL CALC-MCNC: 21 MG/DL (ref 5–40)
WBC # BLD AUTO: 5.8 X10E3/UL (ref 3.4–10.8)

## 2020-10-15 ENCOUNTER — OFFICE VISIT (OUTPATIENT)
Dept: CARDIOLOGY | Facility: CLINIC | Age: 60
End: 2020-10-15

## 2020-10-15 VITALS
SYSTOLIC BLOOD PRESSURE: 142 MMHG | WEIGHT: 174 LBS | OXYGEN SATURATION: 99 % | HEART RATE: 56 BPM | HEIGHT: 69 IN | BODY MASS INDEX: 25.77 KG/M2 | DIASTOLIC BLOOD PRESSURE: 94 MMHG

## 2020-10-15 DIAGNOSIS — E78.2 MIXED HYPERLIPIDEMIA: ICD-10-CM

## 2020-10-15 DIAGNOSIS — I25.10 CORONARY ARTERY DISEASE INVOLVING NATIVE CORONARY ARTERY OF NATIVE HEART WITHOUT ANGINA PECTORIS: Primary | ICD-10-CM

## 2020-10-15 DIAGNOSIS — I10 ESSENTIAL HYPERTENSION: ICD-10-CM

## 2020-10-15 PROCEDURE — 93000 ELECTROCARDIOGRAM COMPLETE: CPT | Performed by: INTERNAL MEDICINE

## 2020-10-15 PROCEDURE — 99214 OFFICE O/P EST MOD 30 MIN: CPT | Performed by: INTERNAL MEDICINE

## 2020-10-15 NOTE — PROGRESS NOTES
Date of Office Visit: 10/15/2020    Patient Name: Mega Mckeon  : 1960    Encounter Provider: Burak Carcamo MD  Referring Provider: No ref. provider found  Place of Service: [facility] CARDIOLOGY  Patient Care Team:  Marizol Palomo III, NP-C as PCP - General (Family Medicine)  Burak Carcamo MD as Consulting Physician (Cardiology)      Chief Complaint   Patient presents with   • Coronary Artery Disease     annual   • Hypertension   • Hyperlipidemia     History of Present Illness    The patient is a 60-year-old white male with a history of coronary artery disease.He has had a previous inferior myocardial infarction with a drug-eluting stent placed to the right coronary artery many years ago.  His left ventricular function is normal.  He denies any chest pain or shortness of breath.  He has no complaints of lightheadedness no dizziness.    Lipid panel most recently checked.  Labs are well within target range except for HDL on the low side.    Past Medical History:   Diagnosis Date   • Arthritis    • CAD (coronary artery disease)    • Heart disease    • History of kidney stones    • History of measles    • History of mumps    • Hyperlipidemia    • Ischemic cardiomyopathy    • Kidney stone    • Myocardial infarction (CMS/HCC)     status: post drug eluting stent to the right coronary artery   • Whooping cough          Past Surgical History:   Procedure Laterality Date   • COLONOSCOPY  2015    Dr Stevens    • CORONARY ANGIOPLASTY WITH STENT PLACEMENT  2011    RCA           Current Outpatient Medications:   •  aspirin 81 MG tablet, Take 1 tablet by mouth Daily., Disp: 30 tablet, Rfl: 11  •  atorvastatin (LIPITOR) 40 MG tablet, TAKE 1 TABLET BY MOUTH DAILY, Disp: 90 tablet, Rfl: 3  •  metoprolol tartrate (LOPRESSOR) 25 MG tablet, TAKE 1/2 TABLET BY MOUTH DAILY, Disp: 45 tablet, Rfl: 3      Social History     Socioeconomic History   •  "Marital status:      Spouse name: Not on file   • Number of children: 1   • Years of education: Not on file   • Highest education level: Not on file   Tobacco Use   • Smoking status: Never Smoker   • Smokeless tobacco: Never Used   • Tobacco comment: caffeine use   Substance and Sexual Activity   • Alcohol use: No   • Drug use: No   • Sexual activity: Defer   Social History Narrative    Self employed - works in concrete          Review of Systems   Constitution: Negative.   HENT: Negative.    Eyes: Negative.    Cardiovascular: Negative.    Respiratory: Negative.    Endocrine: Negative.    Skin: Negative.    Musculoskeletal: Negative.    Gastrointestinal: Negative.    Neurological: Negative.    Psychiatric/Behavioral: Negative.        Procedures      ECG 12 Lead    Date/Time: 10/15/2020 1:06 PM  Performed by: Burak Carcamo MD  Authorized by: Burak Carcamo MD   Comparison: compared with previous ECG from 10/9/2019  Similar to previous ECG  Rhythm: sinus rhythm  Rate: normal  Conduction: conduction normal  ST Segments: ST segments normal  QRS axis: normal                  Objective:    /94   Pulse 56   Ht 175.3 cm (69\")   Wt 78.9 kg (174 lb)   SpO2 99%   BMI 25.70 kg/m²         Vitals signs reviewed.   Constitutional:       Appearance: Well-developed.   Eyes:      Pupils: Pupils are equal, round, and reactive to light.   HENT:      Head: Normocephalic.   Neck:      Musculoskeletal: Normal range of motion.      Thyroid: No thyromegaly.      Vascular: No carotid bruit or JVD.   Pulmonary:      Effort: Pulmonary effort is normal.      Breath sounds: Normal breath sounds.   Cardiovascular:      Normal rate. Regular rhythm.      No gallop.   Pulses:     Intact distal pulses.   Edema:     Peripheral edema absent.   Abdominal:      General: Bowel sounds are normal.      Palpations: Abdomen is soft.   Skin:     General: Skin is warm and dry.      Findings: No erythema.   Neurological:      " Mental Status: Alert and oriented to person, place, and time.             Assessment:       Diagnosis Plan   1. Coronary artery disease involving native coronary artery of native heart without angina pectoris     2. Essential hypertension     3. Mixed hyperlipidemia         1.  Coronary artery disease: Status post previous stent.  Infarction with ELY to the right coronary artery.  Normal left ventricular function.  Asymptomatic  2.  Hypertension: Controlled.  I personally took his blood pressure here in the office at 120/80  3.  Hyperlipidemia: On statin therapy with good results.  Should be able to exercise more and raise his HDL     Plan:

## 2020-11-03 RX ORDER — ATORVASTATIN CALCIUM 40 MG/1
TABLET, FILM COATED ORAL
Qty: 90 TABLET | Refills: 1 | Status: SHIPPED | OUTPATIENT
Start: 2020-11-03 | End: 2021-06-02

## 2021-02-24 ENCOUNTER — OFFICE VISIT (OUTPATIENT)
Dept: INTERNAL MEDICINE | Facility: CLINIC | Age: 61
End: 2021-02-24

## 2021-02-24 VITALS
OXYGEN SATURATION: 98 % | TEMPERATURE: 98 F | SYSTOLIC BLOOD PRESSURE: 130 MMHG | RESPIRATION RATE: 16 BRPM | BODY MASS INDEX: 26.63 KG/M2 | WEIGHT: 179.8 LBS | HEART RATE: 61 BPM | DIASTOLIC BLOOD PRESSURE: 82 MMHG | HEIGHT: 69 IN

## 2021-02-24 DIAGNOSIS — I10 ESSENTIAL HYPERTENSION: Chronic | ICD-10-CM

## 2021-02-24 DIAGNOSIS — E78.2 MIXED HYPERLIPIDEMIA: Chronic | ICD-10-CM

## 2021-02-24 DIAGNOSIS — R53.83 OTHER FATIGUE: ICD-10-CM

## 2021-02-24 DIAGNOSIS — I25.10 CORONARY ARTERY DISEASE INVOLVING NATIVE CORONARY ARTERY OF NATIVE HEART WITHOUT ANGINA PECTORIS: Primary | Chronic | ICD-10-CM

## 2021-02-24 PROBLEM — E78.5 HYPERLIPIDEMIA: Chronic | Status: ACTIVE | Noted: 2018-11-04

## 2021-02-24 PROBLEM — R07.9 CHEST PAIN: Status: RESOLVED | Noted: 2018-11-04 | Resolved: 2021-02-24

## 2021-02-24 PROBLEM — R07.89 ATYPICAL CHEST PAIN: Status: RESOLVED | Noted: 2018-11-04 | Resolved: 2021-02-24

## 2021-02-24 LAB
ALBUMIN SERPL-MCNC: 4.6 G/DL (ref 3.5–5.2)
ALBUMIN/GLOB SERPL: 2.2 G/DL
ALP SERPL-CCNC: 125 U/L (ref 39–117)
ALT SERPL-CCNC: 33 U/L (ref 1–41)
AST SERPL-CCNC: 29 U/L (ref 1–40)
BILIRUB SERPL-MCNC: 0.9 MG/DL (ref 0–1.2)
BUN SERPL-MCNC: 18 MG/DL (ref 8–23)
BUN/CREAT SERPL: 19.4 (ref 7–25)
CALCIUM SERPL-MCNC: 9.4 MG/DL (ref 8.6–10.5)
CHLORIDE SERPL-SCNC: 105 MMOL/L (ref 98–107)
CHOLEST SERPL-MCNC: 143 MG/DL (ref 0–200)
CO2 SERPL-SCNC: 24.8 MMOL/L (ref 22–29)
CREAT SERPL-MCNC: 0.93 MG/DL (ref 0.76–1.27)
ERYTHROCYTE [DISTWIDTH] IN BLOOD BY AUTOMATED COUNT: 12.6 % (ref 12.3–15.4)
GLOBULIN SER CALC-MCNC: 2.1 GM/DL
GLUCOSE SERPL-MCNC: 108 MG/DL (ref 65–99)
HCT VFR BLD AUTO: 41.8 % (ref 37.5–51)
HDLC SERPL-MCNC: 32 MG/DL (ref 40–60)
HGB BLD-MCNC: 13.7 G/DL (ref 13–17.7)
LDLC SERPL CALC-MCNC: 88 MG/DL (ref 0–100)
LDLC/HDLC SERPL: 2.68 {RATIO}
MCH RBC QN AUTO: 30 PG (ref 26.6–33)
MCHC RBC AUTO-ENTMCNC: 32.8 G/DL (ref 31.5–35.7)
MCV RBC AUTO: 91.5 FL (ref 79–97)
PLATELET # BLD AUTO: 264 10*3/MM3 (ref 140–450)
POTASSIUM SERPL-SCNC: 4.4 MMOL/L (ref 3.5–5.2)
PROT SERPL-MCNC: 6.7 G/DL (ref 6–8.5)
RBC # BLD AUTO: 4.57 10*6/MM3 (ref 4.14–5.8)
SODIUM SERPL-SCNC: 139 MMOL/L (ref 136–145)
TRIGL SERPL-MCNC: 127 MG/DL (ref 0–150)
VIT B12 SERPL-MCNC: 413 PG/ML (ref 211–946)
VLDLC SERPL CALC-MCNC: 23 MG/DL (ref 5–40)
WBC # BLD AUTO: 5.85 10*3/MM3 (ref 3.4–10.8)

## 2021-02-24 PROCEDURE — 99214 OFFICE O/P EST MOD 30 MIN: CPT | Performed by: NURSE PRACTITIONER

## 2021-02-24 NOTE — PATIENT INSTRUCTIONS
Stay well hydrated when working outside during the summer.     Diet:    • Eat vegetables, fruits, whole grain, low-fat dairy, poultry, fish, beans, nontropical vegetable oils, and nuts, but avoid red meat (i.e., Mediterranean-style diet, DASH [Dietary Approaches to Stop Hypertension] diet).  • Limit sugary drinks and sweets.  • Limit saturated and trans fat to 5% to 6% of calories.  • Limit sodium intake to 2,400 mg daily (about one teaspoon table salt [kosher/sea salt have less sodium per teaspoon]).  Weight loss / Calorie Counting Apps:    • Lose It!   • [place] Pal   • Works great when you try it with a partner/ friend  Exercise:   • Engage in moderate-to-vigorous aerobic activity for at least 40 minutes (on average) three to four times each week.  Wearables:   • Activity tracker   • Step tracker   Skin Care:   • Use sun screen SPF >30 daily  • Dermatologist for skin check regularly  Bone Health:   • [email or web address]    CDC recommends Flu vaccines for everyone 6 months and older every season with rare exceptions.    Vaccines:     Shingles vaccine is given in TWO separate injections.   CDC recommends that healthy adults 50 years and older get two doses of the shingles vaccine called Shingrix (recombinant zoster vaccine),  by 2 to 6 months, to prevent shingles and the complications from the disease.

## 2021-02-24 NOTE — ASSESSMENT & PLAN NOTE
Lipid abnormalities are improving with treatment.  Pharmacotherapy as ordered.  Lipids will be reassessed in 6 months.    Lab Results   Component Value Date    CHOL 123 10/09/2019    CHLPL 116 08/24/2020    TRIG 106 08/24/2020    HDL 34 (L) 08/24/2020    LDL 61 08/24/2020

## 2021-02-24 NOTE — PROGRESS NOTES
Chief Complaint  Hyperlipidemia (Pt Presents here today for a 6 month follow up ) and Hypertension     Subjective:      History of Present Illness {CC  Problem List  Visit  Diagnosis   Encounters  Notes  Medications  Labs  Result Review Imaging  Media :23}     Mega Mckeon presents to [facility] for chronic medical conditions including:   Hypertension, coronary artery disease (inferior MI ELY R coronary artery 2011), hyperlipidemia, OA (stable - no longer on nsaids), (prior kidney stone - last FU with [facility] 1/20/2020 - negative for stones)     LV: 8/24/2020    He has fu with Dr Carcamo on 10/15/2020  EKG: SR    Works in [employer] he has no chest pain, soa whilst working.     Hypertension   This is a chronic problem. The current episode started more than 1 year ago. The problem is controlled. Pertinent negatives include no chest pain, headaches, palpitations, peripheral edema or shortness of breath. There are no associated agents to hypertension. Risk factors for coronary artery disease include male gender and dyslipidemia. Past treatments include beta blockers. Current antihypertension treatment includes beta blockers. There are no compliance problems.  Hypertensive end-organ damage includes CAD/MI.     Hyperlipidemia   This is a chronic problem. Episode onset: 2011. The problem is controlled. Recent lipid tests were reviewed and are normal. Factors aggravating his hyperlipidemia include beta blockers. Pertinent negatives include no chest pain or shortness of breath. Current antihyperlipidemic treatment includes statins. The current treatment provides significant improvement of lipids. There are no compliance problems.  Risk factors for coronary artery disease include hypertension, male sex and dyslipidemia.     Coronary Artery Disease   Presents for follow-up (MI 2011 ) visit. Pertinent negatives include no chest pain, leg swelling,  "palpitations or shortness of breath. Risk factors include hyperlipidemia. The symptoms have been stable. Compliance with diet is variable. Compliance with exercise is good. Compliance with medications is good.     He states at time he feel fatigue and will get tingling sensation to hands and feet at night.  No motor weakness.              Objective:      Physical Exam  Vitals signs reviewed.   Constitutional:       Appearance: He is well-developed.   HENT:      Head: Normocephalic.      Comments: Wearing mask due to COVID   Eyes:      Conjunctiva/sclera: Conjunctivae normal.      Pupils: Pupils are equal, round, and reactive to light.   Neck:      Musculoskeletal: Normal range of motion and neck supple.      Thyroid: No thyromegaly.   Cardiovascular:      Rate and Rhythm: Normal rate and regular rhythm.      Pulses: Normal pulses.      Heart sounds: Normal heart sounds.   Pulmonary:      Effort: Pulmonary effort is normal.      Breath sounds: Normal breath sounds.      Comments: E/U   Abdominal:      General: Bowel sounds are normal.      Palpations: Abdomen is soft.   Musculoskeletal: Normal range of motion.   Lymphadenopathy:      Cervical: No cervical adenopathy.   Skin:     General: Skin is warm and dry.      Capillary Refill: Capillary refill takes 2 to 3 seconds.   Neurological:      Mental Status: He is alert and oriented to person, place, and time.   Psychiatric:         Behavior: Behavior normal. Behavior is cooperative.         Thought Content: Thought content normal.         Judgment: Judgment normal.        Result Review  Data Reviewed:{ Labs  Result Review  Imaging  Med Tab  Media :23}          Vital Signs:   /82 (BP Location: Left arm, Patient Position: Sitting, Cuff Size: Adult)   Pulse 61   Temp 98 °F (36.7 °C) (Temporal)   Resp 16   Ht 175.3 cm (69\")   Wt 81.6 kg (179 lb 12.8 oz)   SpO2 98%   BMI 26.55 kg/m²         Requested Prescriptions      No prescriptions requested or ordered " in this encounter     Routine medications provided by this office will also be refilled via pharmacy request.       Current Outpatient Medications:   •  aspirin 81 MG tablet, Take 1 tablet by mouth Daily., Disp: 30 tablet, Rfl: 11  •  atorvastatin (LIPITOR) 40 MG tablet, TAKE ONE TABLET BY MOUTH DAILY, Disp: 90 tablet, Rfl: 1  •  metoprolol tartrate (LOPRESSOR) 25 MG tablet, TAKE ONE-HALF TABLET BY MOUTH DAILY, Disp: 45 tablet, Rfl: 1     Assessment and Plan:      Assessment and Plan {CC Problem List  Visit Diagnosis  ROS  Review (Popup)  [facility] Maintenance  Quality  BestPractice  Medications  SmartSets  SnapShot Encounters  Media :23}     Problem List Items Addressed This Visit        Cardiac and Vasculature    Coronary artery disease - Primary (Chronic)    Overview     Inferior MI s/p ELY R coronary artery 2011         Current Assessment & Plan     Coronary artery disease is improving with treatment.  Continue current treatment regimen.  Dietary sodium restriction.  Regular aerobic exercise.  Cardiac status will be reassessed in 6 months.    Continues ASA and statin.          Essential hypertension (Chronic)    Current Assessment & Plan     Hypertension is improving with treatment.  Continue current treatment regimen.  Dietary sodium restriction.  Regular aerobic exercise.  Blood pressure will be reassessed at the next regular appointment.         Relevant Orders    Comprehensive Metabolic Panel    CBC (No Diff)    Hyperlipidemia (Chronic)    Current Assessment & Plan     Lipid abnormalities are improving with treatment.  Pharmacotherapy as ordered.  Lipids will be reassessed in 6 months.    Lab Results   Component Value Date    CHOL 123 10/09/2019    CHLPL 116 08/24/2020    TRIG 106 08/24/2020    HDL 34 (L) 08/24/2020    LDL 61 08/24/2020            Relevant Orders    Comprehensive Metabolic Panel    Lipid Panel With LDL / HDL Ratio    CBC (No Diff)      Other Visit Diagnoses     Other fatigue         Relevant Orders    Vitamin B12          Follow Up {Instructions Charge Capture  Follow-up Communications :23}     Return in about 6 months (around 8/24/2021) for Annual physical.  Patient was given instructions and counseling regarding his condition or for health maintenance advice. Please see specific information pulled into the AVS if appropriate.    Jilon disclaimer:   Much of this encounter note is an electronic transcription/translation of spoken language to printed text. The electronic translation of spoken language may permit erroneous, or at times, nonsensical words or phrases to be inadvertently transcribed; Although I have reviewed the note for such errors, some may still exist.     Additional Patient Counseling:       Patient Instructions   Stay well hydrated when working outside during the summer.     Diet:    • Eat vegetables, fruits, whole grain, low-fat dairy, poultry, fish, beans, nontropical vegetable oils, and nuts, but avoid red meat (i.e., Mediterranean-style diet, DASH [Dietary Approaches to Stop Hypertension] diet).  • Limit sugary drinks and sweets.  • Limit saturated and trans fat to 5% to 6% of calories.  • Limit sodium intake to 2,400 mg daily (about one teaspoon table salt [kosher/sea salt have less sodium per teaspoon]).  Weight loss / Calorie Counting Apps:    • Lose It!   • MyFitness Pal   • Works great when you try it with a partner/ friend  Exercise:   • Engage in moderate-to-vigorous aerobic activity for at least 40 minutes (on average) three to four times each week.  Wearables:   • Activity tracker   • Step tracker   Skin Care:   • Use sun screen SPF >30 daily  • Dermatologist for skin check regularly  Bone Health:   • Https://[email or web address].org/patients/treatment/nutrition/    CDC recommends Flu vaccines for everyone 6 months and older every season with rare exceptions.    Vaccines:     Shingles vaccine is given in TWO separate injections.   CDC recommends that healthy adults 50 years  and older get two doses of the shingles vaccine called Shingrix (recombinant zoster vaccine),  by 2 to 6 months, to prevent shingles and the complications from the disease.

## 2021-02-24 NOTE — ASSESSMENT & PLAN NOTE
Coronary artery disease is improving with treatment.  Continue current treatment regimen.  Dietary sodium restriction.  Regular aerobic exercise.  Cardiac status will be reassessed in 6 months.    Continues ASA and statin.

## 2021-06-02 RX ORDER — ATORVASTATIN CALCIUM 40 MG/1
TABLET, FILM COATED ORAL
Qty: 90 TABLET | Refills: 0 | Status: SHIPPED | OUTPATIENT
Start: 2021-06-02 | End: 2021-09-13

## 2021-08-25 ENCOUNTER — OFFICE VISIT (OUTPATIENT)
Dept: INTERNAL MEDICINE | Facility: CLINIC | Age: 61
End: 2021-08-25

## 2021-08-25 VITALS
TEMPERATURE: 97.1 F | HEIGHT: 69 IN | DIASTOLIC BLOOD PRESSURE: 78 MMHG | OXYGEN SATURATION: 98 % | BODY MASS INDEX: 26.07 KG/M2 | SYSTOLIC BLOOD PRESSURE: 124 MMHG | WEIGHT: 176 LBS | RESPIRATION RATE: 16 BRPM | HEART RATE: 59 BPM

## 2021-08-25 DIAGNOSIS — K57.90 DIVERTICULOSIS: Chronic | ICD-10-CM

## 2021-08-25 DIAGNOSIS — Z12.5 SPECIAL SCREENING, PROSTATE CANCER: ICD-10-CM

## 2021-08-25 DIAGNOSIS — I10 ESSENTIAL HYPERTENSION: Chronic | ICD-10-CM

## 2021-08-25 DIAGNOSIS — Z00.00 ANNUAL PHYSICAL EXAM: Primary | ICD-10-CM

## 2021-08-25 DIAGNOSIS — I25.10 CORONARY ARTERY DISEASE INVOLVING NATIVE CORONARY ARTERY OF NATIVE HEART WITHOUT ANGINA PECTORIS: Chronic | ICD-10-CM

## 2021-08-25 DIAGNOSIS — E78.2 MIXED HYPERLIPIDEMIA: Chronic | ICD-10-CM

## 2021-08-25 PROCEDURE — 99396 PREV VISIT EST AGE 40-64: CPT | Performed by: NURSE PRACTITIONER

## 2021-08-25 NOTE — PATIENT INSTRUCTIONS
It's Summer Time!    Stay hydrated when outside  If your urine starts to get darker, you are not drinking enough     Protect yourself from ticks and mosquitos  Here are the best ingredients to look for:  · DEET (N,N-diethyl-m-toluamide or N,N-diethyl-3-methyl-benzamide)  · Picaridin  · Oil of lemon eucalyptus (p-menthane-3,8-diol or PMD)  Wear light-colored pants so you can spot ticks easier  If you use a permethrin product, ONLY apply to clothing    Practice Safe Sun!    · Use sun screen SPF >30 daily, reapply regularly per directions on package  · See dermatologist for skin check regularly  · Protect your eyes with sunglasses with UV protection    Poison Ivy  If you are going into areas that may have poison ivy, prepare with a product like IvyX or other ivy blocker.  Wash your clothes and pets after being in an area with ivy when returning home. If you come in contact with poison ivy, try a product like Technu     Other things you should incorporate all year...     Diet:    • Eat vegetables, fruits, whole grain, low-fat dairy, poultry, fish, beans, nontropical vegetable oils, and nuts, but avoid red meat (i.e., Mediterranean-style diet, DASH [Dietary Approaches to Stop Hypertension] diet).  • Limit sugary drinks and sweets.  • Limit saturated and trans fat to 5% to 6% of calories.  • Limit sodium intake to 2,400 mg daily (about one teaspoon table salt [kosher/sea salt have less sodium per teaspoon]).  Weight loss / Calorie Counting Apps:    • Lose It!   • [employer] Pal   • Works great when you try it with a partner/ friend. It takes about 15 minutes a day but studies show that this simple method of monitoring your intake can help you achieve goals as it keeps you accountable.  I often will ask patients to try these apps just to get an idea of how much sodium and how many carbohydrates you are taking in.   Exercise:   • Engage in moderate-to-vigorous aerobic activity for at least 40 minutes (on average) three to  four times each week.  Wearables:   • Activity tracker   • Step tracker: getting 7,500 steps daily can cut your cardiac risks by 44%   Bone Health:   • Https://www.nof.org/patients/treatment/nutrition/  • Routine weight bearing exercise      If you have not yet had your COVID vaccine, I highly recommend you schedule to have one ASAP.   You are not only protecting your health but you are protecting a love one that may be more vulnerable than you if they were to contract the virus.     If you are vaccinated, please be advised you can still contract COVID.   The goal of the vaccine is to decrease severe outcomes which so far it has been very effective.

## 2021-08-25 NOTE — ASSESSMENT & PLAN NOTE
Lipid abnormalities are improving with treatment.  Pharmacotherapy as ordered.  Lipids will be reassessed in 6 months.    Lab Results   Component Value Date    CHOL 123 10/09/2019    CHLPL 143 02/24/2021    TRIG 127 02/24/2021    HDL 32 (L) 02/24/2021    LDL 88 02/24/2021

## 2021-08-25 NOTE — PROGRESS NOTES
"        Chief Complaint  Annual Exam     Subjective:      History of Present Illness {CC  Problem List  Visit  Diagnosis   Encounters  Notes  Medications  Labs  Result Review Imaging  Media :23}     Mega Mckeon presents to [facility] for annual exam.     He has chronic medical conditions including:   Hypertension, coronary artery disease (inferior MI ELY R coronary artery 2011), hyperlipidemia, OA (stable - no longer on nsaids), (prior kidney stone - last FOLLOW UP - nonobstructive stone on left, sees Dr Ramachandran)     He has not had COVID vaccines - discussed risks.     Mega is here for coordination of medical care, to discuss health maintenance, disease prevention as well as to followup on medical problems.       Patient Care Team:  Marizol Palomo III, NP-C as PCP - General (Family Medicine)  Burak Carcamo MD as Consulting Physician (Cardiology)  Dominic Ramachandran Jr., MD as Consulting Physician (Urology)       He states that his activity level is heavy.   his diet is in general, a \"healthy\" diet  .   Feels fairly well with few complaints.     Health and Weight:   Weight trend is   Wt Readings from Last 4 Encounters:   08/25/21 79.8 kg (176 lb)   02/24/21 81.6 kg (179 lb 12.8 oz)   10/15/20 78.9 kg (174 lb)   08/24/20 79.8 kg (176 lb)         Blood Pressure:   BP Readings from Last 3 Encounters:   08/25/21 124/78   02/24/21 130/82   10/15/20 142/94        Cholesterol Screen:   Most men start screen at 35, if you have family history or comorbidities it may be screen earlier.     Risk Evaluation:  1. Cardiovascular risk factors: hypertension, hyperlipidemia, male gender, patient has CAD and is being treated.  2. Diabetes risk factors: none.   3. Cancer risk factors: no risk factors for cancer.    Prevention:   Cholesterol test will check. Cholesterol is normal..  Blood sugar test will check. Fasting blood sugar  is normal..  Hepatitis  C screen: [] " Due  [x] Completed 8/24/2020 (negative)    Colon Cancer Screen:   He has no change in bowel habits.   Patient's last colonoscopy was 5/8/2015.   He is advised to repeat in 10 years.   Diverticulosis per last CT: 2021  Family history: [x] None    [] Yes:      Genital/ Urinary Health:   · He voids without difficulty.    Testicular cancer Screen:   Testicular self exams recommended once a month.   Advised that any firm testicular nodules to be reported immediately.    Prostate cancer screening:  PSA was reviewed   Was just checked at [facility] but does not have result back yet.   Family history: [x] None    [] Yes:     Lung Cancer Screen:   [x] Nonsmoker  [] History of smoking  []     Vaccines Due:   [] Pneumovax    []  [x] Shingrix (shingles: series of 2)   []  [x] COVID (series of 2)    []  -DECLINES    Eye health:   Always where sunglass when outside with UV protection.   Goggles when working.     Skin Cancer:   Regular Sunsceen: Advised  Routine self assessment of your skin, report any changes.     His cardiovascular risks are:    [] No Known risk factors    [x] Hypertension   [x] Hyperlipidemia  [] Diabetes    [] Obesity  [] Family history   [] Current or hx tobacco use  [] Sedentary lifestyle   [] Testosterone use      Works in [employer].     Objective:      Physical Exam  Vitals reviewed.   Constitutional:       Appearance: He is well-developed.   HENT:      Head: Normocephalic and atraumatic.      Right Ear: External ear normal.      Left Ear: External ear normal.      Nose: Nose normal.   Eyes:      Conjunctiva/sclera: Conjunctivae normal.      Pupils: Pupils are equal, round, and reactive to light.   Neck:      Thyroid: No thyromegaly.      Vascular: No JVD.   Cardiovascular:      Rate and Rhythm: Normal rate and regular rhythm.      Pulses: Normal pulses.           Radial pulses are 2+ on the right side and 2+ on the left side.      Heart sounds: Normal heart sounds, S1 normal and S2 normal. No murmur  heard.   No friction rub. No gallop.    Pulmonary:      Effort: Pulmonary effort is normal.      Breath sounds: Normal breath sounds.   Chest:      Chest wall: No deformity.   Abdominal:      General: Bowel sounds are normal.      Palpations: Abdomen is soft.      Tenderness: There is no abdominal tenderness. Negative signs include Cook's sign.      Hernia: No hernia is present. There is no hernia in the left inguinal area or right inguinal area.   Genitourinary:     Penis: Normal and uncircumcised.       Testes: Normal. Cremasteric reflex is present.   Musculoskeletal:      Cervical back: Normal range of motion and neck supple.   Lymphadenopathy:      Cervical: No cervical adenopathy.   Skin:     General: Skin is warm and dry.      Capillary Refill: Capillary refill takes 2 to 3 seconds.      Nails: There is no clubbing.   Neurological:      Mental Status: He is alert and oriented to person, place, and time.      Cranial Nerves: No cranial nerve deficit.      Sensory: No sensory deficit.      Motor: Motor function is intact.   Psychiatric:         Mood and Affect: Mood normal.         Speech: Speech normal.         Behavior: Behavior normal. Behavior is cooperative.         Thought Content: Thought content normal.         Judgment: Judgment normal.        Result Review  Data Reviewed:{ Labs  Result Review  Imaging  Med Tab  Media :23}   The following data was reviewed by: Marizol Palomo III, NP-C on 08/25/2021  Lab Results - Last 18 Months   Lab Units 02/24/21  0942 08/24/20  1105   GLUCOSE mg/dL 108* 111*   BUN mg/dL 18 14   CREATININE mg/dL 0.93 0.84   EGFR IF NONAFRICN AM mL/min/1.73 83 95   EGFR IF AFRICN AM mL/min/1.73 100 110   SODIUM mmol/L 139 145*   POTASSIUM mmol/L 4.4 4.6   CHLORIDE mmol/L 105 105   CALCIUM mg/dL 9.4 9.5   ALBUMIN g/dL 4.60 4.5   BILIRUBIN mg/dL 0.9 0.6   ALK PHOS U/L 125* 110   AST (SGOT) U/L 29 27   ALT (SGPT) U/L 33 28   CHOLESTEROL mg/dL 143 116   TRIGLYCERIDES  "mg/dL 127 106   HDL CHOL mg/dL 32* 34*   VLDL CHOL mg/dL  --  21   VLDL CHOLESTEROL CATY mg/dL 23  --    LDL CHOL mg/dL 88 61   LDL/HDL RATIO  2.68 1.8   WBC 10*3/mm3 5.85 5.8   RBC 10*6/mm3 4.57 4.35   HEMATOCRIT % 41.8 40.7   MCV fL 91.5 94   MCH pg 30.0 32.2          Vital Signs:   /78 (BP Location: Left arm, Patient Position: Sitting, Cuff Size: Adult)   Pulse 59   Temp 97.1 °F (36.2 °C) (Temporal)   Resp 16   Ht 175.3 cm (69\")   Wt 79.8 kg (176 lb)   SpO2 98%   BMI 25.99 kg/m²         Requested Prescriptions      No prescriptions requested or ordered in this encounter     Routine medications provided by this office will also be refilled via pharmacy request.       Current Outpatient Medications:   •  aspirin 81 MG tablet, Take 1 tablet by mouth Daily., Disp: 30 tablet, Rfl: 11  •  atorvastatin (LIPITOR) 40 MG tablet, TAKE ONE TABLET BY MOUTH DAILY, Disp: 90 tablet, Rfl: 0  •  metoprolol tartrate (LOPRESSOR) 25 MG tablet, TAKE 1/2 TABLET BY MOUTH DAILY, Disp: 45 tablet, Rfl: 0     Assessment and Plan:      Assessment and Plan {CC Problem List  Visit Diagnosis  ROS  Review (Popup)  Health Maintenance  Quality  BestPractice  Medications  SmartSets  SnapShot Encounters  Media :23}     Problem List Items Addressed This Visit        Cardiac and Vasculature    Coronary artery disease (Chronic)    Overview     Inferior MI s/p ELY R coronary artery 2011  *Continues ASA and Statin         Current Assessment & Plan     Coronary artery disease is improving with treatment.  Continue current treatment regimen.  Dietary sodium restriction.  Regular aerobic exercise.  Cardiac status will be reassessed in 6 months.         Relevant Orders    Comprehensive Metabolic Panel    Lipid Panel With LDL / HDL Ratio    Essential hypertension (Chronic)    Overview     Current treatment:  Lopressor 25 mg twice a day         Current Assessment & Plan     Hypertension is improving with treatment.  Continue current " treatment regimen.  Dietary sodium restriction.  Regular aerobic exercise.  Blood pressure will be reassessed at the next regular appointment.         Hyperlipidemia (Chronic)    Overview     Current treatment:  lipitor 40 mg daily          Current Assessment & Plan     Lipid abnormalities are improving with treatment.  Pharmacotherapy as ordered.  Lipids will be reassessed in 6 months.    Lab Results   Component Value Date    CHOL 123 10/09/2019    CHLPL 143 02/24/2021    TRIG 127 02/24/2021    HDL 32 (L) 02/24/2021    LDL 88 02/24/2021            Relevant Orders    Comprehensive Metabolic Panel    Lipid Panel With LDL / HDL Ratio       Gastrointestinal Abdominal     Diverticulosis (Chronic)    Overview     Per CT: 2021  Life long high fiber diet           Other Visit Diagnoses     Annual physical exam    -  Primary    Relevant Orders    Comprehensive Metabolic Panel    Lipid Panel With LDL / HDL Ratio    Special screening, prostate cancer              PSA done at urology - will attempt to get results from urology. He states he has called and they have not called him back.     Follow Up {Instructions Charge Capture  Follow-up Communications :23}     Return in about 6 months (around 2/25/2022).    Patient was given instructions and counseling regarding his condition or for health maintenance advice. Please see specific information pulled into the AVS if appropriate.    Dragon disclaimer:   Much of this encounter note is an electronic transcription/translation of spoken language to printed text. The electronic translation of spoken language may permit erroneous, or at times, nonsensical words or phrases to be inadvertently transcribed; Although I have reviewed the note for such errors, some may still exist.     Additional Patient Counseling:       Patient Instructions     It's Summer Time!    Stay hydrated when outside  If your urine starts to get darker, you are not drinking enough     Protect yourself from ticks  and mosquitos  Here are the best ingredients to look for:  · DEET (N,N-diethyl-m-toluamide or N,N-diethyl-3-methyl-benzamide)  · Picaridin  · Oil of lemon eucalyptus (p-menthane-3,8-diol or PMD)  Wear light-colored pants so you can spot ticks easier  If you use a permethrin product, ONLY apply to clothing    Practice Safe Sun!    · Use sun screen SPF >30 daily, reapply regularly per directions on package  · See dermatologist for skin check regularly  · Protect your eyes with sunglasses with UV protection    Poison Ivy  If you are going into areas that may have poison ivy, prepare with a product like IvyX or other ivy blocker.  Wash your clothes and pets after being in an area with ivy when returning home. If you come in contact with poison ivy, try a product like Technu     Other things you should incorporate all year...     Diet:    • Eat vegetables, fruits, whole grain, low-fat dairy, poultry, fish, beans, nontropical vegetable oils, and nuts, but avoid red meat (i.e., Mediterranean-style diet, DASH [Dietary Approaches to Stop Hypertension] diet).  • Limit sugary drinks and sweets.  • Limit saturated and trans fat to 5% to 6% of calories.  • Limit sodium intake to 2,400 mg daily (about one teaspoon table salt [kosher/sea salt have less sodium per teaspoon]).  Weight loss / Calorie Counting Apps:    • Lose It!   • My[employer] Pal   • Works great when you try it with a partner/ friend. It takes about 15 minutes a day but studies show that this simple method of monitoring your intake can help you achieve goals as it keeps you accountable.  I often will ask patients to try these apps just to get an idea of how much sodium and how many carbohydrates you are taking in.   Exercise:   • Engage in moderate-to-vigorous aerobic activity for at least 40 minutes (on average) three to four times each week.  Wearables:   • Activity tracker   • Step tracker: getting 7,500 steps daily can cut your cardiac risks by 44%   Bone Health:    • Https://www.nof.org/patients/treatment/nutrition/  • Routine weight bearing exercise      If you have not yet had your COVID vaccine, I highly recommend you schedule to have one ASAP.   You are not only protecting your health but you are protecting a love one that may be more vulnerable than you if they were to contract the virus.     If you are vaccinated, please be advised you can still contract COVID.   The goal of the vaccine is to decrease severe outcomes which so far it has been very effective.

## 2021-08-25 NOTE — ASSESSMENT & PLAN NOTE
Coronary artery disease is improving with treatment.  Continue current treatment regimen.  Dietary sodium restriction.  Regular aerobic exercise.  Cardiac status will be reassessed in 6 months.

## 2021-08-26 LAB
ALBUMIN SERPL-MCNC: 4.5 G/DL (ref 3.5–5.2)
ALBUMIN/GLOB SERPL: 1.7 G/DL
ALP SERPL-CCNC: 111 U/L (ref 39–117)
ALT SERPL-CCNC: 36 U/L (ref 1–41)
AST SERPL-CCNC: 31 U/L (ref 1–40)
BILIRUB SERPL-MCNC: 0.6 MG/DL (ref 0–1.2)
BUN SERPL-MCNC: 19 MG/DL (ref 8–23)
BUN/CREAT SERPL: 22.1 (ref 7–25)
CALCIUM SERPL-MCNC: 9.7 MG/DL (ref 8.6–10.5)
CHLORIDE SERPL-SCNC: 107 MMOL/L (ref 98–107)
CHOLEST SERPL-MCNC: 132 MG/DL (ref 0–200)
CO2 SERPL-SCNC: 24.2 MMOL/L (ref 22–29)
CREAT SERPL-MCNC: 0.86 MG/DL (ref 0.76–1.27)
GLOBULIN SER CALC-MCNC: 2.6 GM/DL
GLUCOSE SERPL-MCNC: 116 MG/DL (ref 65–99)
HDLC SERPL-MCNC: 32 MG/DL (ref 40–60)
LDLC SERPL CALC-MCNC: 81 MG/DL (ref 0–100)
LDLC/HDLC SERPL: 2.5 {RATIO}
POTASSIUM SERPL-SCNC: 5.3 MMOL/L (ref 3.5–5.2)
PROT SERPL-MCNC: 7.1 G/DL (ref 6–8.5)
SODIUM SERPL-SCNC: 141 MMOL/L (ref 136–145)
TRIGL SERPL-MCNC: 100 MG/DL (ref 0–150)
VLDLC SERPL CALC-MCNC: 19 MG/DL (ref 5–40)

## 2021-09-13 RX ORDER — ATORVASTATIN CALCIUM 40 MG/1
TABLET, FILM COATED ORAL
Qty: 90 TABLET | Refills: 0 | Status: SHIPPED | OUTPATIENT
Start: 2021-09-13 | End: 2021-12-28

## 2021-09-21 ENCOUNTER — IMMUNIZATION (OUTPATIENT)
Dept: VACCINE CLINIC | Facility: HOSPITAL | Age: 61
End: 2021-09-21

## 2021-09-21 PROCEDURE — 91300 HC SARSCOV02 VAC 30MCG/0.3ML IM: CPT | Performed by: INTERNAL MEDICINE

## 2021-09-21 PROCEDURE — 0001A: CPT | Performed by: INTERNAL MEDICINE

## 2021-10-12 ENCOUNTER — IMMUNIZATION (OUTPATIENT)
Dept: VACCINE CLINIC | Facility: HOSPITAL | Age: 61
End: 2021-10-12

## 2021-10-12 PROCEDURE — 91300 HC SARSCOV02 VAC 30MCG/0.3ML IM: CPT | Performed by: INTERNAL MEDICINE

## 2021-10-12 PROCEDURE — 0002A: CPT | Performed by: INTERNAL MEDICINE

## 2021-10-19 ENCOUNTER — OFFICE VISIT (OUTPATIENT)
Dept: CARDIOLOGY | Facility: CLINIC | Age: 61
End: 2021-10-19

## 2021-10-19 VITALS
HEART RATE: 56 BPM | SYSTOLIC BLOOD PRESSURE: 128 MMHG | HEIGHT: 69 IN | WEIGHT: 178.2 LBS | OXYGEN SATURATION: 98 % | BODY MASS INDEX: 26.39 KG/M2 | DIASTOLIC BLOOD PRESSURE: 86 MMHG

## 2021-10-19 DIAGNOSIS — E78.2 MIXED HYPERLIPIDEMIA: Chronic | ICD-10-CM

## 2021-10-19 DIAGNOSIS — I10 ESSENTIAL HYPERTENSION: Chronic | ICD-10-CM

## 2021-10-19 DIAGNOSIS — I25.118 CORONARY ARTERY DISEASE INVOLVING NATIVE CORONARY ARTERY OF NATIVE HEART WITH OTHER FORM OF ANGINA PECTORIS (HCC): Primary | ICD-10-CM

## 2021-10-19 PROCEDURE — 99214 OFFICE O/P EST MOD 30 MIN: CPT | Performed by: INTERNAL MEDICINE

## 2021-10-19 PROCEDURE — 93000 ELECTROCARDIOGRAM COMPLETE: CPT | Performed by: INTERNAL MEDICINE

## 2021-10-19 NOTE — PROGRESS NOTES
OFFICE VISIT      Date of Office Visit: 10/19/2021    Patient Name: Mega Mckeon  : 1960    Encounter Provider: Burak Carcamo MD  Referring Provider: No ref. provider found  Primary Care Provider: Marizol Palomo III, NP-C  Place of Service: [facility] CARDIOLOGY        Chief Complaint   Patient presents with   • Coronary Artery Disease   • Follow-up     History of Present Illness    The patient is a 61-year-old white male with coronary artery disease as well as hyperlipidemia who presents back to the office today for a follow-up evaluation.    The patient experienced a previous inferior wall myocardial infarction that necessitated drug-eluting stent to the right coronary artery.  Despite this his left ventricular function remains normal.  Recently he has noted some substernal discomfort.  It occurs mostly in the evening.  With exertion he does not really notice much.  He has a fairly physically demanding job and during these times he does not really complain of any discomfort.  The episodes are variable duration.  They are not associated with any radiation.  Past Medical History:   Diagnosis Date   • Arthritis    • Atypical chest pain 2018   • CAD (coronary artery disease)    • Heart disease    • History of kidney stones    • History of measles    • History of mumps    • Hyperlipidemia    • Ischemic cardiomyopathy    • Kidney stone    • Myocardial infarction (HCC)     status: post drug eluting stent to the right coronary artery   • Whooping cough          Past Surgical History:   Procedure Laterality Date   • COLONOSCOPY  2015    Dr Stevens    • CORONARY ANGIOPLASTY WITH STENT PLACEMENT  2011    RCA           Current Outpatient Medications:   •  aspirin 81 MG tablet, Take 1 tablet by mouth Daily., Disp: 30 tablet, Rfl: 11  •  atorvastatin (LIPITOR) 40 MG tablet, TAKE ONE TABLET BY MOUTH DAILY, Disp: 90 tablet, Rfl: 0  •  metoprolol  "tartrate (LOPRESSOR) 25 MG tablet, TAKE 1/2 TABLET BY MOUTH DAILY, Disp: 45 tablet, Rfl: 0      Social History     Socioeconomic History   • Marital status:    • Number of children: 1   Tobacco Use   • Smoking status: Never Smoker   • Smokeless tobacco: Never Used   • Tobacco comment: caffeine use   Substance and Sexual Activity   • Alcohol use: No   • Drug use: No   • Sexual activity: Defer         Review of Systems   Constitutional: Negative.   HENT: Negative.    Eyes: Negative.    Cardiovascular: Positive for chest pain.   Respiratory: Negative.    Endocrine: Negative.    Skin: Negative.    Musculoskeletal: Negative.    Gastrointestinal: Negative.    Neurological: Negative.    Psychiatric/Behavioral: Negative.        Procedures      ECG 12 Lead    Date/Time: 10/19/2021 11:45 AM  Performed by: Burak Carcamo MD  Authorized by: Burak Carcamo MD   Comparison: compared with previous ECG from 10/15/2020  Rhythm: sinus rhythm  Rate: normal  Conduction: conduction normal  ST Segments: ST segments normal  QRS axis: normal                  Objective:    /86 (BP Location: Right arm, Patient Position: Sitting)   Pulse 56   Ht 175.3 cm (69\")   Wt 80.8 kg (178 lb 3.2 oz)   SpO2 98%   BMI 26.32 kg/m²         Vitals reviewed.   Constitutional:       Appearance: Healthy appearance. Well-developed and not in distress.   Neck:      Thyroid: No thyromegaly.      Vascular: No carotid bruit or JVD. JVD normal.   Pulmonary:      Effort: Pulmonary effort is normal.      Breath sounds: Normal breath sounds.   Cardiovascular:      Normal rate. Regular rhythm. Normal S1. Normal S2.      Murmurs: There is no murmur.      No gallop.   Pulses:     Intact distal pulses.   Edema:     Peripheral edema absent.   Skin:     General: Skin is warm and dry.      Findings: No erythema.   Neurological:      Mental Status: Alert and oriented to person, place, and time.             Assessment:       Diagnosis Plan   1. " Coronary artery disease involving native coronary artery of native heart with other form of angina pectoris (HCC)  Treadmill Stress Test   2. Essential hypertension     3. Mixed hyperlipidemia       1.  Coronary artery disease: Previous inferior wall myocardial infarction with intervention to the right coronary artery.  Chest discomfort suggesting angina pectoris.  Follow-up stress test.  2.  Hypertension: Controlled on medical therapy  3.  Hyperlipidemia: On statin therapy.  Most recent lipid panel was done 1 month ago with his total cholesterol 132, LDL 81, HDL 32.       Plan:       1.  Stress test

## 2021-11-12 ENCOUNTER — APPOINTMENT (OUTPATIENT)
Dept: CARDIOLOGY | Facility: HOSPITAL | Age: 61
End: 2021-11-12

## 2021-12-02 ENCOUNTER — HOSPITAL ENCOUNTER (OUTPATIENT)
Dept: CARDIOLOGY | Facility: HOSPITAL | Age: 61
Discharge: HOME | End: 2021-12-02
Admitting: INTERNAL MEDICINE

## 2021-12-02 DIAGNOSIS — I25.118 CORONARY ARTERY DISEASE INVOLVING NATIVE CORONARY ARTERY OF NATIVE HEART WITH OTHER FORM OF ANGINA PECTORIS (HCC): ICD-10-CM

## 2021-12-02 LAB
BH CV STRESS BP STAGE 1: NORMAL
BH CV STRESS BP STAGE 2: NORMAL
BH CV STRESS DURATION MIN STAGE 1: 3
BH CV STRESS DURATION MIN STAGE 2: 3
BH CV STRESS DURATION MIN STAGE 3: 1
BH CV STRESS DURATION SEC STAGE 1: 0
BH CV STRESS DURATION SEC STAGE 2: 0
BH CV STRESS DURATION SEC STAGE 3: 30
BH CV STRESS GRADE STAGE 1: 10
BH CV STRESS GRADE STAGE 2: 12
BH CV STRESS GRADE STAGE 3: 14
BH CV STRESS HR STAGE 1: 122
BH CV STRESS HR STAGE 2: 150
BH CV STRESS HR STAGE 3: 164
BH CV STRESS METS STAGE 1: 5
BH CV STRESS METS STAGE 2: 7.5
BH CV STRESS METS STAGE 3: 10
BH CV STRESS PROTOCOL 1: NORMAL
BH CV STRESS RECOVERY BP: NORMAL MMHG
BH CV STRESS RECOVERY HR: 99 BPM
BH CV STRESS SPEED STAGE 1: 1.7
BH CV STRESS SPEED STAGE 2: 2.5
BH CV STRESS SPEED STAGE 3: 3.4
BH CV STRESS STAGE 1: 1
BH CV STRESS STAGE 2: 2
BH CV STRESS STAGE 3: 3
MAXIMAL PREDICTED HEART RATE: 159 BPM
PERCENT MAX PREDICTED HR: 103.14 %
STRESS BASELINE BP: NORMAL MMHG
STRESS BASELINE HR: 71 BPM
STRESS PERCENT HR: 121 %
STRESS POST ESTIMATED WORKLOAD: 9 METS
STRESS POST EXERCISE DUR MIN: 7 MIN
STRESS POST EXERCISE DUR SEC: 30 SEC
STRESS POST PEAK BP: NORMAL MMHG
STRESS POST PEAK HR: 164 BPM
STRESS TARGET HR: 135 BPM

## 2021-12-02 PROCEDURE — 93017 CV STRESS TEST TRACING ONLY: CPT

## 2021-12-02 PROCEDURE — 93016 CV STRESS TEST SUPVJ ONLY: CPT | Performed by: INTERNAL MEDICINE

## 2021-12-02 PROCEDURE — 93018 CV STRESS TEST I&R ONLY: CPT | Performed by: INTERNAL MEDICINE

## 2021-12-28 RX ORDER — ATORVASTATIN CALCIUM 40 MG/1
TABLET, FILM COATED ORAL
Qty: 90 TABLET | Refills: 1 | Status: SHIPPED | OUTPATIENT
Start: 2021-12-28 | End: 2022-08-01

## 2022-03-08 ENCOUNTER — OFFICE VISIT (OUTPATIENT)
Dept: INTERNAL MEDICINE | Facility: CLINIC | Age: 62
End: 2022-03-08

## 2022-03-08 VITALS
WEIGHT: 181.8 LBS | TEMPERATURE: 98.1 F | BODY MASS INDEX: 26.93 KG/M2 | SYSTOLIC BLOOD PRESSURE: 130 MMHG | HEIGHT: 69 IN | OXYGEN SATURATION: 98 % | HEART RATE: 70 BPM | DIASTOLIC BLOOD PRESSURE: 68 MMHG

## 2022-03-08 DIAGNOSIS — I10 ESSENTIAL HYPERTENSION: Primary | ICD-10-CM

## 2022-03-08 DIAGNOSIS — R73.09 ELEVATED GLUCOSE: ICD-10-CM

## 2022-03-08 DIAGNOSIS — I25.10 CORONARY ARTERY DISEASE INVOLVING NATIVE CORONARY ARTERY OF NATIVE HEART WITHOUT ANGINA PECTORIS: ICD-10-CM

## 2022-03-08 DIAGNOSIS — E78.2 MIXED HYPERLIPIDEMIA: ICD-10-CM

## 2022-03-08 PROBLEM — G56.00 CARPAL TUNNEL SYNDROME: Status: ACTIVE | Noted: 2021-12-09

## 2022-03-08 PROCEDURE — 99214 OFFICE O/P EST MOD 30 MIN: CPT | Performed by: NURSE PRACTITIONER

## 2022-03-08 NOTE — ASSESSMENT & PLAN NOTE
Lipid abnormalities are improving with treatment.  Pharmacotherapy as ordered.  Lipids will be reassessed in 6 months.    Lab Results   Component Value Date    CHOL 123 10/09/2019    CHLPL 132 08/25/2021    TRIG 100 08/25/2021    HDL 32 (L) 08/25/2021    LDL 81 08/25/2021

## 2022-03-08 NOTE — PROGRESS NOTES
Chief Complaint  Coronary Artery Disease, Hypertension, and Hyperlipidemia (Patient presents here today for a 6 month follow up )     Subjective:      History of Present Illness {CC  Problem List  Visit  Diagnosis   Encounters  Notes  Medications  Labs  Result Review Imaging  Media :23}     Mega Mckeon presents to [facility] for follow up chronic medical conditions including: Hypertension, coronary artery disease (inferior MI ELY R coronary artery 2011), hyperlipidemia, OA (stable - no longer on nsaids), (prior kidney stone - last FOLLOW UP - nonobstructive stone on left, sees Dr Ramachandran)     Since last visit: stress test 12/2/2021:   Treadmill Stress Test (12/02/2021 9:10 AM)  Normal stress test.     Continues BB for Hypertension.   He denies CP.     CAD: continues ASA and statin.  No complaint of myalgia.     Family hx: diabetes. Last glucose up to 116 from 108. States he does eat quite a few sweets.     I have reviewed patient's medical history, any new submitted information provided by patient or medical assistant and updated medical record.      Objective:      Physical Exam  Vitals reviewed.   Constitutional:       Appearance: Normal appearance. He is well-developed.   HENT:      Head:      Comments: Wearing mask due to COVID   Neck:      Thyroid: No thyromegaly.      Vascular: No carotid bruit.   Cardiovascular:      Rate and Rhythm: Normal rate and regular rhythm.      Pulses: Normal pulses.      Heart sounds: Normal heart sounds. No murmur heard.  Pulmonary:      Effort: Pulmonary effort is normal.      Breath sounds: Normal breath sounds.      Comments: E/U   Musculoskeletal:      Cervical back: Normal range of motion and neck supple.      Right lower leg: No edema.   Lymphadenopathy:      Cervical: No cervical adenopathy.   Skin:     General: Skin is warm and dry.      Capillary Refill: Capillary refill takes 2 to 3 seconds.   Neurological:      Mental  "Status: He is alert and oriented to person, place, and time.   Psychiatric:         Mood and Affect: Mood normal.         Behavior: Behavior normal. Behavior is cooperative.         Thought Content: Thought content normal.         Judgment: Judgment normal.        Result Review  Data Reviewed:{ Labs  Result Review  Imaging  Med Tab  Media :23}     The following data was reviewed by: Marizol Palomo III, NP-C on 03/08/2022  Common labs    Common Labsle 8/25/21 8/25/21    0925 0925   Glucose 116 (A)    BUN 19    Creatinine 0.86    eGFR Non  Am 90    eGFR African Am 110    Sodium 141    Potassium 5.3 (A)    Chloride 107    Calcium 9.7    Total Protein 7.1    Albumin 4.50    Total Bilirubin 0.6    Alkaline Phosphatase 111    AST (SGOT) 31    ALT (SGPT) 36    Total Cholesterol  132   Triglycerides  100   HDL Cholesterol  32 (A)   LDL Cholesterol   81   (A) Abnormal value       Comments are available for some flowsheets but are not being displayed.                  Vital Signs:   /68 (BP Location: Left arm, Patient Position: Sitting, Cuff Size: Adult)   Pulse 70   Temp 98.1 °F (36.7 °C) (Temporal)   Ht 175.3 cm (69\")   Wt 82.5 kg (181 lb 12.8 oz)   SpO2 98%   BMI 26.85 kg/m²         Requested Prescriptions      No prescriptions requested or ordered in this encounter       Routine medications provided by this office will also be refilled via pharmacy request.       Current Outpatient Medications:   •  aspirin 81 MG tablet, Take 1 tablet by mouth Daily., Disp: 30 tablet, Rfl: 11  •  atorvastatin (LIPITOR) 40 MG tablet, TAKE ONE TABLET BY MOUTH DAILY, Disp: 90 tablet, Rfl: 1  •  metoprolol tartrate (LOPRESSOR) 25 MG tablet, TAKE 1/2 TABLET BY MOUTH DAILY, Disp: 45 tablet, Rfl: 1     Assessment and Plan:      Assessment and Plan {CC Problem List  Visit Diagnosis  ROS  Review (Popup)  Health Maintenance  Quality  BestPractice  Medications  SmartSets  SnapShot Encounters  Media " :23}     Problem List Items Addressed This Visit        Cardiac and Vasculature    Coronary artery disease (Chronic)    Overview     Inferior MI s/p ELY R coronary artery 2011  *Continues ASA and Statin           Relevant Orders    Lipid Panel With LDL / HDL Ratio    Essential hypertension - Primary (Chronic)    Overview     Current treatment:  Lopressor 25 mg twice a day           Current Assessment & Plan     Hypertension is improving with treatment.  Continue current treatment regimen.  Dietary sodium restriction.  Regular aerobic exercise.  Continue current medications.  Blood pressure will be reassessed at the next regular appointment.           Relevant Orders    Comprehensive Metabolic Panel    CBC (No Diff)    Hyperlipidemia (Chronic)    Overview     Current treatment:  lipitor 40 mg daily            Current Assessment & Plan     Lipid abnormalities are improving with treatment.  Pharmacotherapy as ordered.  Lipids will be reassessed in 6 months.    Lab Results   Component Value Date    CHOL 123 10/09/2019    CHLPL 132 08/25/2021    TRIG 100 08/25/2021    HDL 32 (L) 08/25/2021    LDL 81 08/25/2021              Relevant Orders    Lipid Panel With LDL / HDL Ratio      Other Visit Diagnoses     Elevated glucose        Cut back on sugar intake - check A1C     Relevant Orders    Comprehensive Metabolic Panel    Hemoglobin A1c          Follow Up {Instructions Charge Capture  Follow-up Communications :23}     Return in about 6 months (around 9/8/2022) for Annual physical.    Patient was given instructions and counseling regarding his condition or for health maintenance advice. Please see specific information pulled into the AVS if appropriate.    Keenan disclaimer:   Much of this encounter note is an electronic transcription/translation of spoken language to printed text. The electronic translation of spoken language may permit erroneous, or at times, nonsensical words or phrases to be inadvertently transcribed;  Although I have reviewed the note for such errors, some may still exist.     Additional Patient Counseling:       There are no Patient Instructions on file for this visit.

## 2022-03-09 LAB
ALBUMIN SERPL-MCNC: 4.7 G/DL (ref 3.8–4.8)
ALBUMIN/GLOB SERPL: 2.2 {RATIO} (ref 1.2–2.2)
ALP SERPL-CCNC: 123 IU/L (ref 44–121)
ALT SERPL-CCNC: 33 IU/L (ref 0–44)
AST SERPL-CCNC: 29 IU/L (ref 0–40)
BILIRUB SERPL-MCNC: 0.5 MG/DL (ref 0–1.2)
BUN SERPL-MCNC: 18 MG/DL (ref 8–27)
BUN/CREAT SERPL: 19 (ref 10–24)
CALCIUM SERPL-MCNC: 9.2 MG/DL (ref 8.6–10.2)
CHLORIDE SERPL-SCNC: 106 MMOL/L (ref 96–106)
CHOLEST SERPL-MCNC: 124 MG/DL (ref 100–199)
CO2 SERPL-SCNC: 21 MMOL/L (ref 20–29)
CREAT SERPL-MCNC: 0.95 MG/DL (ref 0.76–1.27)
EGFR GENE MUT ANL BLD/T: 90 ML/MIN/1.73
ERYTHROCYTE [DISTWIDTH] IN BLOOD BY AUTOMATED COUNT: 12.4 % (ref 11.6–15.4)
GLOBULIN SER CALC-MCNC: 2.1 G/DL (ref 1.5–4.5)
GLUCOSE SERPL-MCNC: 107 MG/DL (ref 65–99)
HBA1C MFR BLD: 5.8 % (ref 4.8–5.6)
HCT VFR BLD AUTO: 42.1 % (ref 37.5–51)
HDLC SERPL-MCNC: 30 MG/DL
HGB BLD-MCNC: 14.6 G/DL (ref 13–17.7)
LDLC SERPL CALC-MCNC: 77 MG/DL (ref 0–99)
LDLC/HDLC SERPL: 2.6 RATIO (ref 0–3.6)
MCH RBC QN AUTO: 31.1 PG (ref 26.6–33)
MCHC RBC AUTO-ENTMCNC: 34.7 G/DL (ref 31.5–35.7)
MCV RBC AUTO: 90 FL (ref 79–97)
PLATELET # BLD AUTO: 258 X10E3/UL (ref 150–450)
POTASSIUM SERPL-SCNC: 4.6 MMOL/L (ref 3.5–5.2)
PROT SERPL-MCNC: 6.8 G/DL (ref 6–8.5)
RBC # BLD AUTO: 4.7 X10E6/UL (ref 4.14–5.8)
SODIUM SERPL-SCNC: 140 MMOL/L (ref 134–144)
TRIGL SERPL-MCNC: 88 MG/DL (ref 0–149)
VLDLC SERPL CALC-MCNC: 17 MG/DL (ref 5–40)
WBC # BLD AUTO: 5.5 X10E3/UL (ref 3.4–10.8)

## 2022-07-01 NOTE — TELEPHONE ENCOUNTER
Please advise filling Metoprolol    LOV   -   10/19/2021    Next   -   Not scheduled.  Not notes when to return so I will give him 3 months and advise to make appointment.      Last labs   -   3/8/2022 CBC, CMP    Cherelle GEE

## 2022-08-01 RX ORDER — ATORVASTATIN CALCIUM 40 MG/1
40 TABLET, FILM COATED ORAL DAILY
Qty: 90 TABLET | Refills: 0 | Status: SHIPPED | OUTPATIENT
Start: 2022-08-01 | End: 2022-11-18 | Stop reason: SDUPTHER

## 2022-08-01 NOTE — TELEPHONE ENCOUNTER
Please advise filling Atorvastatin    LOV   -    10/16/2021  Next   -   Not scheduled - not sure when due.  Last labs   -   3/8/2022  Lipid    Cherelle GEE

## 2022-09-13 PROBLEM — R73.03 PREDIABETES: Status: ACTIVE | Noted: 2022-09-13

## 2022-09-13 NOTE — PROGRESS NOTES
"        Chief Complaint  Annual Exam     Subjective:      History of Present Illness {CC  Problem List  Visit  Diagnosis   Encounters  Notes  Medications  Labs  Result Review Imaging  Media :23}     Mega Mckeon presents to [facility] for:  Annual exam     He has chronic medical conditions including: Hypertension, coronary artery disease (inferior MI ELY R coronary artery 2011), hyperlipidemia, OA (stable - no longer on nsaids), (prior kidney stone - last FOLLOW UP - nonobstructive stone on left, sees Dr Ramachandran)     States latest follow up with urology - no change. States no stones on recent imaging.     Needs follow up with Dr Carcamo - per patient he is retiring and needs to establish with new cardiologist.     Works in [employer]  [place] [email or web address] on vacation.   No new issues.     No CP, LISSETH Ayoub is here for coordination of medical care, to discuss health maintenance, disease prevention as well as to follow up on medical problems.     Patient Care Team:  Marizol Palomo III, NP-C as PCP - General (Family Medicine)  Burak Carcamo MD as Consulting Physician (Cardiology)  Dominic Ramachandran Jr., MD as Consulting Physician (Urology)      He states that his activity level is moderate.   His diet is  in general, a \"healthy\" diet  .   Feels well.     Health and Weight:   Weight trend is   Wt Readings from Last 4 Encounters:   09/14/22 81.9 kg (180 lb 9.6 oz)   03/08/22 82.5 kg (181 lb 12.8 oz)   10/19/21 80.8 kg (178 lb 3.2 oz)   08/25/21 79.8 kg (176 lb)         Blood Pressure:   BP Readings from Last 3 Encounters:   09/14/22 122/84   03/08/22 130/68   10/19/21 128/86        Cholesterol Screen:   Most men start screen at 35, if you have family history or comorbidities it may be screen earlier.     Risk Evaluation:  1. Cardiovascular risk factors: hypertension, hyperlipidemia, male gender, patient has CAD and is being treated.  2. Diabetes risk " factors: prediabetes.   3. Cancer risk factors: no risk factors for cancer.    Prevention:   Cholesterol and glucose screen due.     Colon Cancer Screen:    He has no change in bowel habits.   Patient's last colonoscopy was 5/8/2015.  [facility]    He is advised to repeat in 10 years.  Family history: [x] None    [] Yes:     Genital/ Urinary Health:   · He voids without difficulty.    Testicular cancer Screen:   Testicular self exams recommended once a month.   Advised that any firm testicular nodules to be reported immediately.    Prostate cancer screening:  PSA was discussed and ordered He has no increased risk of prostate cancer.   Family history: [x] None    [] Yes:     Lung Cancer Screen:   [x] Nonsmoker  [] History of smoking  []     Last eye exam:  Advise regularly - uses readers   Always where sunglass when outside with UV protection.     Skin Cancer:   Regular Sunsceen: Advised  Routine self assessment of your skin, report any changes.     I have reviewed patient's medical history, any new submitted information provided by patient or medical assistant and updated medical record.      Objective:      Physical Exam  Vitals reviewed.   Constitutional:       Appearance: He is well-developed.   HENT:      Head: Normocephalic and atraumatic.      Right Ear: External ear normal.      Left Ear: External ear normal.      Nose: Nose normal.   Eyes:      Conjunctiva/sclera: Conjunctivae normal.      Pupils: Pupils are equal, round, and reactive to light.   Neck:      Thyroid: No thyromegaly.      Vascular: No JVD.   Cardiovascular:      Rate and Rhythm: Normal rate and regular rhythm.      Pulses: Normal pulses.           Radial pulses are 2+ on the right side and 2+ on the left side.      Heart sounds: Normal heart sounds, S1 normal and S2 normal. No murmur heard.    No friction rub. No gallop.   Pulmonary:      Effort: Pulmonary effort is normal.      Breath sounds: Normal breath sounds.   Chest:      Chest wall: No  deformity.   Abdominal:      General: Bowel sounds are normal.      Palpations: Abdomen is soft.      Tenderness: There is no abdominal tenderness. Negative signs include Cook's sign.      Hernia: No hernia is present. There is no hernia in the left inguinal area or right inguinal area.   Genitourinary:     Penis: Normal and uncircumcised.       Testes: Normal. Cremasteric reflex is present.   Musculoskeletal:      Cervical back: Normal range of motion and neck supple.   Lymphadenopathy:      Cervical: No cervical adenopathy.   Skin:     General: Skin is warm and dry.      Capillary Refill: Capillary refill takes 2 to 3 seconds.      Nails: There is no clubbing.   Neurological:      Mental Status: He is alert and oriented to person, place, and time.      Cranial Nerves: No cranial nerve deficit.      Sensory: No sensory deficit.      Motor: Motor function is intact.   Psychiatric:         Mood and Affect: Mood normal.         Speech: Speech normal.         Behavior: Behavior normal. Behavior is cooperative.         Thought Content: Thought content normal.         Judgment: Judgment normal.        Result Review  Data Reviewed:{ Labs  Result Review  Imaging  Med Tab  Media :23}     The following data was reviewed by: Marizol Palomo III, NP-C on 09/14/2022  Common labs    Common Labsle 3/8/22 3/8/22 3/8/22 3/8/22    0938 0938 0938 0938   Glucose 107 (A)      BUN 18      Creatinine 0.95      Sodium 140      Potassium 4.6      Chloride 106      Calcium 9.2      Total Protein 6.8      Albumin 4.7      Total Bilirubin 0.5      Alkaline Phosphatase 123 (A)      AST (SGOT) 29      ALT (SGPT) 33      WBC   5.5    Hemoglobin   14.6    Hematocrit   42.1    Platelets   258    Total Cholesterol  124     Triglycerides  88     HDL Cholesterol  30 (A)     LDL Cholesterol   77     Hemoglobin A1C    5.8 (A)   (A) Abnormal value       Comments are available for some flowsheets but are not being displayed.        "           Vital Signs:   /84 (BP Location: Left arm, Patient Position: Sitting, Cuff Size: Adult)   Pulse 74   Temp 97.5 °F (36.4 °C) (Temporal)   Ht 175.3 cm (69\")   Wt 81.9 kg (180 lb 9.6 oz)   SpO2 98%   BMI 26.67 kg/m²         Requested Prescriptions      No prescriptions requested or ordered in this encounter       Routine medications provided by this office will also be refilled via pharmacy request.       Current Outpatient Medications:   •  aspirin 81 MG tablet, Take 1 tablet by mouth Daily., Disp: 30 tablet, Rfl: 11  •  atorvastatin (LIPITOR) 40 MG tablet, Take 1 tablet by mouth Daily. MUST MAKE FOLLOW UP APPOINTMENT FOR FUTURE REFILLS, Disp: 90 tablet, Rfl: 0  •  metoprolol tartrate (LOPRESSOR) 25 MG tablet, Take 0.5 tablets by mouth Daily. MUST MAKE APPOINTMENT FOR FUTURE REFILLS, Disp: 45 tablet, Rfl: 0     Assessment and Plan:      Assessment and Plan {CC Problem List  Visit Diagnosis  ROS  Review (Popup)  Health Maintenance  Quality  BestPractice  Medications  SmartSets  SnapShot Encounters  Media :23}     Problem List Items Addressed This Visit        Cardiac and Vasculature    Coronary artery disease (Chronic)    Overview     Inferior MI s/p ELY R coronary artery 2011  *Continues ASA and Statin         Current Assessment & Plan     No CP - he will need to establish with new cardiologist.   He states he will let Dona refer him.    If he has issues, will let me know and I will place referral.          Essential hypertension (Chronic)    Overview     Current treatment:  Lopressor 25 mg twice a day         Current Assessment & Plan     Hypertension is improving with treatment.  Continue current treatment regimen.  Dietary sodium restriction.  Regular aerobic exercise.  Continue current medications.  Blood pressure will be reassessed at the next regular appointment.         Relevant Orders    Comprehensive Metabolic Panel    CBC (No Diff)    Hyperlipidemia (Chronic)    " Overview     Current treatment:  lipitor 40 mg daily          Current Assessment & Plan     Lipid abnormalities are improving with treatment.  Pharmacotherapy as ordered.  Lipids will be reassessed in 6 months.         Relevant Orders    Comprehensive Metabolic Panel    Lipid Panel With LDL / HDL Ratio       Endocrine and Metabolic    Prediabetes (Chronic)    Overview     Limit sugar and carb intake         Relevant Orders    Hemoglobin A1c       Gastrointestinal Abdominal     Diverticulosis (Chronic)    Overview     Per CT: 2021  Life long high fiber diet            Genitourinary and Reproductive     Personal history of kidney stones    Overview     Followed by urology            Other Visit Diagnoses     Annual physical exam    -  Primary    Relevant Orders    Comprehensive Metabolic Panel    CBC (No Diff)    Special screening, prostate cancer        Relevant Orders    PSA SCREENING          Follow Up {Instructions Charge Capture  Follow-up Communications :23}     Return in about 6 months (around 3/14/2023).      Patient was given instructions and counseling regarding his condition or for health maintenance advice. Please see specific information pulled into the AVS if appropriate.    Dragon disclaimer:   Much of this encounter note is an electronic transcription/translation of spoken language to printed text. The electronic translation of spoken language may permit erroneous, or at times, nonsensical words or phrases to be inadvertently transcribed; Although I have reviewed the note for such errors, some may still exist.     Additional Patient Counseling:       Patient Instructions   Diet:    • Eat vegetables, fruits, whole grain, low-fat dairy, poultry, fish, beans, nontropical vegetable oils, and nuts, but avoid red meat (i.e., Mediterranean-style diet, DASH [Dietary Approaches to Stop Hypertension] diet).  • Limit sugary drinks and sweets.  • Limit saturated and trans fat to 5% to 6% of calories.  • Limit  sodium intake to 2,400 mg daily (about one teaspoon table salt [kosher/sea salt have less sodium per teaspoon]).  Weight loss / Calorie Counting Apps:    • Lose It!   • MyFitness Pal   • Works great when you try it with a partner/ friend  Exercise:   • Engage in moderate-to-vigorous aerobic activity for at least 40 minutes (on average) three to four times each week.  Wearables:   • Activity tracker   • Step tracker   Skin Care:   • Use sun screen SPF >30 daily  • Dermatologist for skin check regularly  Bone Health:   • [email or web address]/patients/treatment/[email or web address]    CDC recommends Flu vaccines for everyone 6 months and older every season with rare exceptions.

## 2022-09-13 NOTE — PATIENT INSTRUCTIONS
Diet:    Eat vegetables, fruits, whole grain, low-fat dairy, poultry, fish, beans, nontropical vegetable oils, and nuts, but avoid red meat (i.e., Mediterranean-style diet, DASH [Dietary Approaches to Stop Hypertension] diet).  Limit sugary drinks and sweets.  Limit saturated and trans fat to 5% to 6% of calories.  Limit sodium intake to 2,400 mg daily (about one teaspoon table salt [kosher/sea salt have less sodium per teaspoon]).  Weight loss / Calorie Counting Apps:    Lose It!   [employer] Devices Pal   Works great when you try it with a partner/ friend  Exercise:   Engage in moderate-to-vigorous aerobic activity for at least 40 minutes (on average) three to four times each week.  Wearables:   Activity tracker   Step tracker   Skin Care:   Use sun screen SPF >30 daily  Dermatologist for skin check regularly  Bone Health:   [email or web address]/patients/treatment/[email or web address]    CDC recommends Flu vaccines for everyone 6 months and older every season with rare exceptions.

## 2022-09-14 ENCOUNTER — OFFICE VISIT (OUTPATIENT)
Dept: INTERNAL MEDICINE | Facility: CLINIC | Age: 62
End: 2022-09-14

## 2022-09-14 VITALS
OXYGEN SATURATION: 98 % | TEMPERATURE: 97.5 F | HEART RATE: 74 BPM | SYSTOLIC BLOOD PRESSURE: 122 MMHG | DIASTOLIC BLOOD PRESSURE: 84 MMHG | WEIGHT: 180.6 LBS | HEIGHT: 69 IN | BODY MASS INDEX: 26.75 KG/M2

## 2022-09-14 DIAGNOSIS — Z12.5 SPECIAL SCREENING, PROSTATE CANCER: ICD-10-CM

## 2022-09-14 DIAGNOSIS — R73.03 PREDIABETES: ICD-10-CM

## 2022-09-14 DIAGNOSIS — I25.10 CORONARY ARTERY DISEASE INVOLVING NATIVE CORONARY ARTERY OF NATIVE HEART WITHOUT ANGINA PECTORIS: Chronic | ICD-10-CM

## 2022-09-14 DIAGNOSIS — E78.2 MIXED HYPERLIPIDEMIA: Chronic | ICD-10-CM

## 2022-09-14 DIAGNOSIS — Z00.00 ANNUAL PHYSICAL EXAM: Primary | ICD-10-CM

## 2022-09-14 DIAGNOSIS — Z87.442 PERSONAL HISTORY OF KIDNEY STONES: ICD-10-CM

## 2022-09-14 DIAGNOSIS — I10 ESSENTIAL HYPERTENSION: Chronic | ICD-10-CM

## 2022-09-14 DIAGNOSIS — K57.90 DIVERTICULOSIS: Chronic | ICD-10-CM

## 2022-09-14 LAB
ALBUMIN SERPL-MCNC: 4.7 G/DL (ref 3.5–5.2)
ALBUMIN/GLOB SERPL: 2.4 G/DL
ALP SERPL-CCNC: 119 U/L (ref 39–117)
ALT SERPL-CCNC: 37 U/L (ref 1–41)
AST SERPL-CCNC: 33 U/L (ref 1–40)
BILIRUB SERPL-MCNC: 0.7 MG/DL (ref 0–1.2)
BUN SERPL-MCNC: 16 MG/DL (ref 8–23)
BUN/CREAT SERPL: 16 (ref 7–25)
CALCIUM SERPL-MCNC: 9.4 MG/DL (ref 8.6–10.5)
CHLORIDE SERPL-SCNC: 105 MMOL/L (ref 98–107)
CHOLEST SERPL-MCNC: 152 MG/DL (ref 0–200)
CO2 SERPL-SCNC: 25.9 MMOL/L (ref 22–29)
CREAT SERPL-MCNC: 1 MG/DL (ref 0.76–1.27)
EGFRCR-CYS SERPLBLD CKD-EPI 2021: 85.1 ML/MIN/1.73
ERYTHROCYTE [DISTWIDTH] IN BLOOD BY AUTOMATED COUNT: 12.7 % (ref 12.3–15.4)
GLOBULIN SER CALC-MCNC: 2 GM/DL
GLUCOSE SERPL-MCNC: 108 MG/DL (ref 65–99)
HBA1C MFR BLD: 5.6 % (ref 4.8–5.6)
HCT VFR BLD AUTO: 43.2 % (ref 37.5–51)
HDLC SERPL-MCNC: 35 MG/DL (ref 40–60)
HGB BLD-MCNC: 14.9 G/DL (ref 13–17.7)
LDLC SERPL CALC-MCNC: 81 MG/DL (ref 0–100)
LDLC/HDLC SERPL: 2.11 {RATIO}
MCH RBC QN AUTO: 32 PG (ref 26.6–33)
MCHC RBC AUTO-ENTMCNC: 34.5 G/DL (ref 31.5–35.7)
MCV RBC AUTO: 92.9 FL (ref 79–97)
PLATELET # BLD AUTO: 247 10*3/MM3 (ref 140–450)
POTASSIUM SERPL-SCNC: 4.8 MMOL/L (ref 3.5–5.2)
PROT SERPL-MCNC: 6.7 G/DL (ref 6–8.5)
PSA SERPL-MCNC: 0.34 NG/ML (ref 0–4)
RBC # BLD AUTO: 4.65 10*6/MM3 (ref 4.14–5.8)
SODIUM SERPL-SCNC: 142 MMOL/L (ref 136–145)
TRIGL SERPL-MCNC: 215 MG/DL (ref 0–150)
VLDLC SERPL CALC-MCNC: 36 MG/DL (ref 5–40)
WBC # BLD AUTO: 8.07 10*3/MM3 (ref 3.4–10.8)

## 2022-09-14 PROCEDURE — 99396 PREV VISIT EST AGE 40-64: CPT | Performed by: NURSE PRACTITIONER

## 2022-09-14 NOTE — ASSESSMENT & PLAN NOTE
No CP - he will need to establish with new cardiologist.   He states he will let Dona refer him.    If he has issues, will let me know and I will place referral.

## 2022-11-18 ENCOUNTER — TELEPHONE (OUTPATIENT)
Dept: CARDIOLOGY | Facility: CLINIC | Age: 62
End: 2022-11-18

## 2022-11-18 RX ORDER — ATORVASTATIN CALCIUM 40 MG/1
40 TABLET, FILM COATED ORAL DAILY
Qty: 90 TABLET | Refills: 2 | Status: SHIPPED | OUTPATIENT
Start: 2022-11-18

## 2022-11-18 NOTE — TELEPHONE ENCOUNTER
Called and spoke with patient to let him know we send some refills to his pharmacy.  I encouraged patient to keep follow up appointment so he can get future refills on his cardiac meds.  He verbalizes understanding and agrees with plan.    .Adela Fernandes RN  [facility] Triage  11/18/22 11:57 EST

## 2022-11-18 NOTE — TELEPHONE ENCOUNTER
Zaheer Porter,    This patient is former patient of Dr. Carcamo.  LOV 10/19/21.  He is calling for refills on his atorvastatin and metoprolol.  Looks like he was given 3 mo refill in July and August of this year but no follow up made.     He states no one ever contacted him about switching providers.    Based on his availability, I made him a follow up appt with you 11/28.  He has lipid panel and CMP from 9/14/22 in Epic from PCP.  He has about 3-4 days of his medications left before he runs out.    Could you send a small refill until you see him to [facility] on [address]?    Thanks,    Adela Fernandes RN  [facility] Triage  11/18/22 10:17 EST

## 2022-11-28 ENCOUNTER — OFFICE VISIT (OUTPATIENT)
Dept: CARDIOLOGY | Facility: CLINIC | Age: 62
End: 2022-11-28

## 2022-11-28 VITALS
DIASTOLIC BLOOD PRESSURE: 76 MMHG | BODY MASS INDEX: 26.96 KG/M2 | WEIGHT: 182 LBS | SYSTOLIC BLOOD PRESSURE: 124 MMHG | HEIGHT: 69 IN | HEART RATE: 65 BPM

## 2022-11-28 DIAGNOSIS — I25.118 CORONARY ARTERY DISEASE INVOLVING NATIVE CORONARY ARTERY OF NATIVE HEART WITH OTHER FORM OF ANGINA PECTORIS: ICD-10-CM

## 2022-11-28 DIAGNOSIS — I10 ESSENTIAL HYPERTENSION: Primary | ICD-10-CM

## 2022-11-28 DIAGNOSIS — E78.2 MIXED HYPERLIPIDEMIA: ICD-10-CM

## 2022-11-28 PROCEDURE — 99214 OFFICE O/P EST MOD 30 MIN: CPT | Performed by: NURSE PRACTITIONER

## 2022-11-28 PROCEDURE — 93000 ELECTROCARDIOGRAM COMPLETE: CPT | Performed by: NURSE PRACTITIONER

## 2022-11-28 NOTE — PROGRESS NOTES
"Date of Office Visit: 22  Encounter Provider: DEEJAY Kong  Place of Service: [facility]  Patient Name: Mega Mckeon  :1960    Chief Complaint   Patient presents with   • Coronary artery disease involving native coronary artery of   • Hypertension   • Hyperlipidemia   • Cardiomyopathy   • Follow-up   :     HPI: Mega Mckeon is a 62 y.o. male  with hypertension, hyperlipidemia, coronary artery disease, previous inferior myocardial infarction and drug-eluting stent to the right coronary artery.        He is a former patient of Dr. Burak Carcamo.  I will visit with him for the first time and have reviewed his medical record.    He had normal LV systolic function At the time of inferior wall myocardial infarction in 2011 that necessitated drug-eluting stent to the right coronary artery.  He had a normal ECG stress test 2021.  He presents today for reassessment.  He has no chest pain or shortness of breath.  He stays active at work.  He has no exertional symptoms.  He denies dizziness or lightheadedness, near-syncope, syncope or palpitations.  He is not performing any structured exercise.  He takes aspirin daily and denies blood in the urine or stool.  No Known Allergies        Family and social history reviewed.     ROS  All other systems were reviewed and are negative          Objective:     Vitals:    22 0857   BP: 124/76   BP Location: Left arm   Patient Position: Sitting   Pulse: 65   Weight: 82.6 kg (182 lb)   Height: 175.3 cm (69\")     Body mass index is 26.88 kg/m².    PHYSICAL EXAM:  Pulmonary:      Effort: Pulmonary effort is normal.      Breath sounds: Normal breath sounds.   Cardiovascular:      Normal rate. Regular rhythm.           ECG 12 Lead    Date/Time: 2022 9:13 AM  Performed by: Charlotte Marie APRN  Authorized by: Charlotte Marie APRN   Comparison: compared with previous ECG   Similar to previous " ECG  Rhythm: sinus rhythm  Rate: normal  Q waves: III    T inversion: III  QRS axis: normal    Clinical impression: abnormal EKG              Current Outpatient Medications   Medication Sig Dispense Refill   • aspirin 81 MG tablet Take 1 tablet by mouth Daily. 30 tablet 11   • atorvastatin (LIPITOR) 40 MG tablet Take 1 tablet by mouth Daily. MUST MAKE FOLLOW UP APPOINTMENT FOR FUTURE REFILLS 90 tablet 2   • metoprolol tartrate (LOPRESSOR) 25 MG tablet Take 0.5 tablets by mouth Daily. MUST MAKE APPOINTMENT FOR FUTURE REFILLS 90 tablet 3     No current facility-administered medications for this visit.     Assessment:       Diagnosis Plan   1. Essential hypertension  ECG 12 Lead      2. Coronary artery disease involving native coronary artery of native heart with other form of angina pectoris (HCC)        3. Mixed hyperlipidemia             Orders Placed This Encounter   Procedures   • ECG 12 Lead     This order was created via procedure documentation     Order Specific Question:   Release to patient     Answer:   Routine Release         Plan:       1.  62-year-old gentleman with coronary artery disease, inferior myocardial infarction 2011 status post right coronary artery stent.  He had normal treadmill ECG December 2021 he has no angina or dyspnea on exertion.  He is to follow-up in 1 year call with any new or worsening symptoms.  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  Hyperlipidemia on atorvastatin 40 mg target LDL 70 or less.  Reviewed most recent lipid panel with LDL 81 September 2022  3.  Hypertension  4.  History of asymptomatic PVCs  5.  History of prediabetes most recent hemoglobin A1c was 5.6.              It has been a pleasure to participate in this patient's care.      Thank you,  DEEJAY Kong      **I used Dragon to dictate this note:**

## 2023-03-16 ENCOUNTER — OFFICE VISIT (OUTPATIENT)
Dept: INTERNAL MEDICINE | Facility: CLINIC | Age: 63
End: 2023-03-16
Payer: COMMERCIAL

## 2023-03-16 VITALS
HEART RATE: 67 BPM | TEMPERATURE: 97.3 F | SYSTOLIC BLOOD PRESSURE: 120 MMHG | BODY MASS INDEX: 27.19 KG/M2 | DIASTOLIC BLOOD PRESSURE: 70 MMHG | HEIGHT: 69 IN | OXYGEN SATURATION: 97 % | WEIGHT: 183.6 LBS

## 2023-03-16 DIAGNOSIS — E78.2 MIXED HYPERLIPIDEMIA: Chronic | ICD-10-CM

## 2023-03-16 DIAGNOSIS — I25.10 CORONARY ARTERY DISEASE INVOLVING NATIVE CORONARY ARTERY OF NATIVE HEART WITHOUT ANGINA PECTORIS: Chronic | ICD-10-CM

## 2023-03-16 DIAGNOSIS — I10 ESSENTIAL HYPERTENSION: Primary | Chronic | ICD-10-CM

## 2023-03-16 PROCEDURE — 99214 OFFICE O/P EST MOD 30 MIN: CPT | Performed by: NURSE PRACTITIONER

## 2023-03-16 NOTE — ASSESSMENT & PLAN NOTE
Lipid abnormalities are improving with treatment.  Pharmacotherapy as ordered.  Lipids will be reassessed in 6 months.    Lab Results   Component Value Date    CHOL 123 10/09/2019    CHLPL 152 09/14/2022    TRIG 215 (H) 09/14/2022    HDL 35 (L) 09/14/2022    LDL 81 09/14/2022

## 2023-03-16 NOTE — PROGRESS NOTES
Chief Complaint  Hypertension (6 month follow up )     Subjective:      History of Present Illness {CC  Problem List  Visit  Diagnosis   Encounters  Notes  Medications  Labs  Result Review Imaging  Media :23}     Mega Mckeon presents to [facility] for:  follow up chronic medical conditions including: Hypertension, coronary artery disease (inferior MI ELY R coronary artery 2011), hyperlipidemia, OA (stable - no longer on nsaids), (prior kidney stone - last FOLLOW UP - nonobstructive stone on left, sees Dr Ramachandran)      Treadmill Stress Test (12/02/2021 9:10 AM)  Normal stress test.      Continues BB for Hypertension.   He denies CP.      CAD: continues ASA and statin.  No complaint of myalgia.      Continues to work in [employer]: no CP with activity.     Bl hand: numb and tingles at times.  States he was supposed to have carpal tunnel surgery but has decided to wait.  Advised cock-up splints to be worn at night.     I have reviewed patient's medical history, any new submitted information provided by patient or medical assistant and updated medical record.      Objective:      Physical Exam  Vitals reviewed.   Constitutional:       Appearance: Normal appearance. He is well-developed.   HENT:      Head:      Comments: Wearing mask due to COVID   Neck:      Thyroid: No thyromegaly.   Cardiovascular:      Rate and Rhythm: Normal rate and regular rhythm.      Pulses: Normal pulses.      Heart sounds: Normal heart sounds.   Pulmonary:      Effort: Pulmonary effort is normal.      Breath sounds: Normal breath sounds.      Comments: E/U   Musculoskeletal:      Cervical back: Normal range of motion and neck supple.   Lymphadenopathy:      Cervical: No cervical adenopathy.   Skin:     General: Skin is warm and dry.   Neurological:      Mental Status: He is alert and oriented to person, place, and time.   Psychiatric:         Mood and Affect: Mood normal.         Behavior:  "Behavior normal. Behavior is cooperative.         Thought Content: Thought content normal.         Judgment: Judgment normal.        Result Review  Data Reviewed:{ Labs  Result Review  Imaging  Med Tab  Media :23}     The following data was reviewed by: Marizol Palomo III, NP-C on 03/16/2023  Common labs    Common Labs 9/14/22 9/14/22 9/14/22 9/14/22 9/14/22    0833 0833 0833 0833 0833   Glucose 108 (A)       BUN 16       Creatinine 1.00       Sodium 142       Potassium 4.8       Chloride 105       Calcium 9.4       Total Protein 6.7       Albumin 4.70       Total Bilirubin 0.7       Alkaline Phosphatase 119 (A)       AST (SGOT) 33       ALT (SGPT) 37       WBC   8.07     Hemoglobin   14.9     Hematocrit   43.2     Platelets   247     Total Cholesterol  152      Triglycerides  215 (A)      HDL Cholesterol  35 (A)      LDL Cholesterol   81      Hemoglobin A1C    5.60    PSA     0.343   (A) Abnormal value       Comments are available for some flowsheets but are not being displayed.                  Vital Signs:   /70 (BP Location: Left arm, Patient Position: Sitting, Cuff Size: Adult)   Pulse 67   Temp 97.3 °F (36.3 °C) (Temporal)   Ht 175.3 cm (69\")   Wt 83.3 kg (183 lb 9.6 oz)   SpO2 97%   BMI 27.11 kg/m²         Requested Prescriptions      No prescriptions requested or ordered in this encounter       Routine medications provided by this office will also be refilled via pharmacy request.       Current Outpatient Medications:   •  aspirin 81 MG tablet, Take 1 tablet by mouth Daily., Disp: 30 tablet, Rfl: 11  •  atorvastatin (LIPITOR) 40 MG tablet, Take 1 tablet by mouth Daily. MUST MAKE FOLLOW UP APPOINTMENT FOR FUTURE REFILLS, Disp: 90 tablet, Rfl: 2  •  metoprolol tartrate (LOPRESSOR) 25 MG tablet, Take 0.5 tablets by mouth Daily. MUST MAKE APPOINTMENT FOR FUTURE REFILLS, Disp: 90 tablet, Rfl: 3     Assessment and Plan:      Assessment and Plan {CC Problem List  Visit Diagnosis  ROS "  Review (Popup)  Health Maintenance  Quality  BestPractice  Medications  SmartSets  SnapShot Encounters  Media :23}     Problem List Items Addressed This Visit        Cardiac and Vasculature    Coronary artery disease (Chronic)    Overview     Inferior MI s/p ELY R coronary artery 2011  *Continues ASA and Statin         Essential hypertension - Primary (Chronic)    Overview     Current treatment:  Lopressor 25 mg twice a day         Current Assessment & Plan     Hypertension is improving with treatment.  Continue current treatment regimen.  Dietary sodium restriction.  Regular aerobic exercise.  Continue current medications.  Blood pressure will be reassessed at the next regular appointment.         Hyperlipidemia (Chronic)    Overview     Current treatment:  lipitor 40 mg daily          Current Assessment & Plan     Lipid abnormalities are improving with treatment.  Pharmacotherapy as ordered.  Lipids will be reassessed in 6 months.    Lab Results   Component Value Date    CHOL 123 10/09/2019    CHLPL 152 09/14/2022    TRIG 215 (H) 09/14/2022    HDL 35 (L) 09/14/2022    LDL 81 09/14/2022               Follow Up {Instructions Charge Capture  Follow-up Communications :23}     Return in about 6 months (around 9/16/2023) for Annual physical.      Patient was given instructions and counseling regarding his condition or for health maintenance advice. Please see specific information pulled into the AVS if appropriate.    Dragon disclaimer:   Much of this encounter note is an electronic transcription/translation of spoken language to printed text. The electronic translation of spoken language may permit erroneous, or at times, nonsensical words or phrases to be inadvertently transcribed; Although I have reviewed the note for such errors, some may still exist.     Additional Patient Counseling:       There are no Patient Instructions on file for this visit.

## 2023-09-29 ENCOUNTER — OFFICE VISIT (OUTPATIENT)
Dept: INTERNAL MEDICINE | Facility: CLINIC | Age: 63
End: 2023-09-29
Payer: COMMERCIAL

## 2023-09-29 VITALS
BODY MASS INDEX: 27.62 KG/M2 | SYSTOLIC BLOOD PRESSURE: 120 MMHG | HEIGHT: 69 IN | DIASTOLIC BLOOD PRESSURE: 80 MMHG | WEIGHT: 186.5 LBS | OXYGEN SATURATION: 98 % | HEART RATE: 64 BPM

## 2023-09-29 DIAGNOSIS — I25.10 CORONARY ARTERY DISEASE INVOLVING NATIVE CORONARY ARTERY OF NATIVE HEART WITHOUT ANGINA PECTORIS: Chronic | ICD-10-CM

## 2023-09-29 DIAGNOSIS — K57.90 DIVERTICULOSIS: Chronic | ICD-10-CM

## 2023-09-29 DIAGNOSIS — Z12.5 SPECIAL SCREENING, PROSTATE CANCER: ICD-10-CM

## 2023-09-29 DIAGNOSIS — Z00.00 ANNUAL PHYSICAL EXAM: Primary | ICD-10-CM

## 2023-09-29 DIAGNOSIS — E78.2 MIXED HYPERLIPIDEMIA: Chronic | ICD-10-CM

## 2023-09-29 DIAGNOSIS — I10 ESSENTIAL HYPERTENSION: Chronic | ICD-10-CM

## 2023-09-29 PROCEDURE — 99396 PREV VISIT EST AGE 40-64: CPT | Performed by: NURSE PRACTITIONER

## 2023-09-29 NOTE — PATIENT INSTRUCTIONS
September is Prostate Cancer Awareness Month   Prostate cancer is the second most common cancer among men.   Screening generally starts at 55 years of age but if you have increased risk factors:   Have at least one first-degree relative (such as your father or brother) who has had prostate cancer  Have at least two extended family members who have had prostate cancer   Are -American, an ethnicity that has a higher risk of developing more aggressive cancers  Your screening could start at a younger age. Please ensure you discuss your risk factors.     https://www.cdc.gov/cancer/prostate/basic_info/index.htm          October is Breast Cancer Awareness Month  Each year more than 245,000 women get breast cancer in the [place].  Each year approximately 2,650 men are diagnosed with breast cancer.     Monitor your breast for changes  Any change in the size or the shape of the breast  Pain in any area of the breast  Nipple discharge other than breast milk (including blood)  A new lump in the breast or underarm  *Continue regular scheduled mammograms    Diet:    Eat vegetables, fruits, whole grain, low-fat dairy, poultry, fish, beans, nontropical vegetable oils, and nuts, but low amounts of red meat (i.e., Mediterranean-style diet, DASH [Dietary Approaches to Stop Hypertension] diet).  Limit sugary drinks and sweets.  Limit saturated and trans fat to 5% to 6% of calories.  Limit sodium intake to 2,400 mg daily (about one teaspoon table salt [kosher/sea salt have less sodium per teaspoon]).  Fad diets will come and go.  Studies show that the most effective diet is one that you can continue long term.     Weight loss / Calorie Counting Apps:    Lose It!   MyPlayrific Pal   Works great when you try it with a partner/ friend    Exercise:   Engage in moderate-to-vigorous aerobic activity for at least 40 minutes (on average) three to four times each week.    Wearables:   Activity tracker   Step tracker: getting 7,500  steps daily can cut your cardiac risks by 44%     Bone Health:   Https://www.nof.org/patients/treatment/nutrition/  Routine weight bearing exercise    Vaccines:   Flu vaccine every fall  https://www.vaccinateyourfamily.org/    COVID booster recommended: newest booster is now available at local pharmacies.   You can take the booster 2 months after previous booster.    If you had recent COVID infection: in general, you may take the booster when feeling well and up to 3 months after infection.   COVID resources: https://govstatus.AllSchoolStuff.com/cuvqigi59    Note: you may take COVID booster along with flu vaccine unless contraindicated.       Mental Health:         Vaccines:     Shingles vaccine is given in TWO separate injections.   CDC recommends that healthy adults 50 years and older get two doses of the shingles vaccine called Shingrix (recombinant zoster vaccine),  by 2 to 6 months, to prevent shingles and the complications from the disease.

## 2023-09-29 NOTE — PROGRESS NOTES
"        Chief Complaint  Annual Exam     Subjective:      History of Present Illness {CC  Problem List  Visit  Diagnosis   Encounters  Notes  Medications  Labs  Result Review Imaging  Media :23}     Mega Mckeon presents to [facility] for:  annual exam    chronic medical conditions including: Hypertension, coronary artery disease (inferior MI ELY R coronary artery 2011), hyperlipidemia, OA (stable - no longer on nsaids), (prior kidney stone - nonobstructive stone on left, sees Dr Ramachandran)      Treadmill Stress Test (12/02/2021 9:10 AM)  Normal stress test.      Continues BB for Hypertension.   He denies CP.      CAD: continues ASA and statin.  No complaint of myalgia.       Mega is here for coordination of medical care, to discuss health maintenance, disease prevention as well as to follow up on medical problems.     Patient Care Team:  Marizol Palomo III, NP-C as PCP - General (Family Medicine)  Burak Carcamo MD as Consulting Physician (Cardiology)  Dominic Ramachandran Jr., MD as Consulting Physician (Urology)      He states that his activity level is heavy.   His diet is in general, a \"healthy\" diet  .   Feels well with few complaints.     Health and Weight:   Weight trend is   Wt Readings from Last 4 Encounters:   09/29/23 84.6 kg (186 lb 8 oz)   03/16/23 83.3 kg (183 lb 9.6 oz)   11/28/22 82.6 kg (182 lb)   09/14/22 81.9 kg (180 lb 9.6 oz)                 Mental Health Check:   Depression screen: PHQ-2 Total Score: 0     Blood Pressure:   BP Readings from Last 3 Encounters:   09/29/23 120/80   03/16/23 120/70   11/28/22 124/76       Risk Evaluation:  1. Cardiovascular risk factors: hypertension, hyperlipidemia, patient has CAD and is being treated.  2. Diabetes risk factors: prediabetes.   3. Cancer risk factors: no risk factors for cancer.    Prevention:   Cholesterol and glucose screen due.     Colon Cancer Screen:   He has no change in bowel " habits.   Patient's last colonoscopy was 5/8/2015.  Hilda    He is advised to repeat in 10 years.  Family history: [x] None    [] Yes:     Genital/ Urinary Health:   He voids without difficulty.      Testicular cancer Screen:   Testicular self exams recommended once a month.   Advised that any firm testicular nodules to be reported immediately.    Prostate cancer screening:  PSA was discussed and ordered He has no increased risk of prostate cancer.   Lab Results   Component Value Date    PSA 0.343 09/14/2022      Family history: [x] None    [] Yes:     Lung Cancer Screen:   [x] Nonsmoker  [] History of smoking  []       Vaccines Due:   [] Prevnar 20     [x] Flu  [x] Shingrix (shingles: series of 2)   []   [x] COVID (booster)     [x] Declines vaccines       Last eye exam:  utd   Always where sunglass when outside with UV protection.     Skin Cancer:   Regular Sunsceen: Advised  Routine self assessment of your skin, report any changes.        I have reviewed patient's medical history, any new submitted information provided by patient or medical assistant and updated medical record.      Objective:      Physical Exam  Vitals reviewed.   Constitutional:       Appearance: He is well-developed.   HENT:      Head: Normocephalic and atraumatic.      Right Ear: External ear normal.      Left Ear: External ear normal.      Nose: Nose normal.   Eyes:      Conjunctiva/sclera: Conjunctivae normal.      Pupils: Pupils are equal, round, and reactive to light.   Neck:      Thyroid: No thyromegaly.      Vascular: No JVD.   Cardiovascular:      Rate and Rhythm: Normal rate and regular rhythm.      Pulses: Normal pulses.           Radial pulses are 2+ on the right side and 2+ on the left side.      Heart sounds: Normal heart sounds, S1 normal and S2 normal. No murmur heard.    No friction rub. No gallop.   Pulmonary:      Effort: Pulmonary effort is normal.      Breath sounds: Normal breath sounds.   Chest:      Chest wall: No  "deformity.   Abdominal:      General: Bowel sounds are normal.      Palpations: Abdomen is soft.      Tenderness: There is no abdominal tenderness. Negative signs include Cook's sign.      Hernia: No hernia is present.   Musculoskeletal:      Cervical back: Normal range of motion and neck supple.   Lymphadenopathy:      Cervical: No cervical adenopathy.   Skin:     General: Skin is warm and dry.      Capillary Refill: Capillary refill takes 2 to 3 seconds.      Nails: There is no clubbing.   Neurological:      Mental Status: He is alert and oriented to person, place, and time.      Cranial Nerves: No cranial nerve deficit.      Sensory: No sensory deficit.      Motor: Motor function is intact.   Psychiatric:         Mood and Affect: Mood normal.         Speech: Speech normal.         Behavior: Behavior normal. Behavior is cooperative.         Thought Content: Thought content normal.         Judgment: Judgment normal.      Result Review  Data Reviewed:{ Labs  Result Review  Imaging  Med Tab  Media :23}     The following data was reviewed by: Marizol Palomo III, NP-C on 09/29/2023           Vital Signs:   /80 (BP Location: Left arm, Patient Position: Sitting, Cuff Size: Adult)   Pulse 64   Ht 175.3 cm (69\")   Wt 84.6 kg (186 lb 8 oz)   SpO2 98%   BMI 27.54 kg/m²         Requested Prescriptions      No prescriptions requested or ordered in this encounter       Routine medications provided by this office will also be refilled via pharmacy request.       Current Outpatient Medications:     aspirin 81 MG tablet, Take 1 tablet by mouth Daily., Disp: 30 tablet, Rfl: 11    atorvastatin (LIPITOR) 40 MG tablet, Take 1 tablet by mouth Daily. MUST MAKE FOLLOW UP APPOINTMENT FOR FUTURE REFILLS, Disp: 90 tablet, Rfl: 2    metoprolol tartrate (LOPRESSOR) 25 MG tablet, Take 0.5 tablets by mouth Daily. MUST MAKE APPOINTMENT FOR FUTURE REFILLS, Disp: 90 tablet, Rfl: 3     Assessment and Plan:    "   Assessment and Plan {CC Problem List  Visit Diagnosis  ROS  Review (Popup)  Health Maintenance  Quality  BestPractice  Medications  SmartSets  SnapShot Encounters  Media :23}     Problem List Items Addressed This Visit          Cardiac and Vasculature    Coronary artery disease (Chronic)    Overview     Inferior MI s/p ELY R coronary artery 2011  *Continues ASA and Statin         Relevant Orders    Comprehensive Metabolic Panel    Lipid Panel With LDL / HDL Ratio    CBC (No Diff)    Essential hypertension (Chronic)    Overview     Current treatment:  Lopressor 25 mg twice a day         Current Assessment & Plan     Hypertension is improving with treatment.  Continue current treatment regimen.  Dietary sodium restriction.  Regular aerobic exercise.  Continue current medications.  Blood pressure will be reassessed at the next regular appointment.         Relevant Orders    Comprehensive Metabolic Panel    CBC (No Diff)    Hyperlipidemia (Chronic)    Overview     Current treatment:  lipitor 40 mg daily          Current Assessment & Plan     Lipid abnormalities are improving with treatment.  Pharmacotherapy as ordered.  Lipids will be reassessed in 6 months.    Lab Results   Component Value Date    CHOL 123 10/09/2019    CHLPL 152 09/14/2022    TRIG 215 (H) 09/14/2022    HDL 35 (L) 09/14/2022    LDL 81 09/14/2022          Relevant Orders    Comprehensive Metabolic Panel    Lipid Panel With LDL / HDL Ratio    CBC (No Diff)       Gastrointestinal Abdominal     Diverticulosis (Chronic)    Overview     Per CT: 2021  Life long high fiber diet          Other Visit Diagnoses       Annual physical exam    -  Primary    Relevant Orders    Comprehensive Metabolic Panel    Lipid Panel With LDL / HDL Ratio    CBC (No Diff)    Special screening, prostate cancer        Relevant Orders    PSA SCREENING                      Follow Up {Instructions Charge Capture  Follow-up Communications :23}     Return in about 6  months (around 3/29/2024).      Patient was given instructions and counseling regarding his condition or for health maintenance advice. Please see specific information pulled into the AVS if appropriate.    Keenan disclaimer:   Much of this encounter note is an electronic transcription/translation of spoken language to printed text. The electronic translation of spoken language may permit erroneous, or at times, nonsensical words or phrases to be inadvertently transcribed; Although I have reviewed the note for such errors, some may still exist.     Additional Patient Counseling:       Patient Instructions       September is Prostate Cancer Awareness Month   Prostate cancer is the second most common cancer among men.   Screening generally starts at 55 years of age but if you have increased risk factors:   Have at least one first-degree relative (such as your father or brother) who has had prostate cancer  Have at least two extended family members who have had prostate cancer   Are -American, an ethnicity that has a higher risk of developing more aggressive cancers  Your screening could start at a younger age. Please ensure you discuss your risk factors.     https://www.cdc.gov/cancer/prostate/basic_info/index.htm          October is Breast Cancer Awareness Month  Each year more than 245,000 women get breast cancer in the [place].  Each year approximately 2,650 men are diagnosed with breast cancer.     Monitor your breast for changes  Any change in the size or the shape of the breast  Pain in any area of the breast  Nipple discharge other than breast milk (including blood)  A new lump in the breast or underarm  *Continue regular scheduled mammograms    Diet:    Eat vegetables, fruits, whole grain, low-fat dairy, poultry, fish, beans, nontropical vegetable oils, and nuts, but low amounts of red meat (i.e., Mediterranean-style diet, DASH [Dietary Approaches to Stop Hypertension] diet).  Limit sugary drinks  and sweets.  Limit saturated and trans fat to 5% to 6% of calories.  Limit sodium intake to 2,400 mg daily (about one teaspoon table salt [kosher/sea salt have less sodium per teaspoon]).  Fad diets will come and go.  Studies show that the most effective diet is one that you can continue long term.     Weight loss / Calorie Counting Apps:    Lose It!   MyFitno Pal   Works great when you try it with a partner/ friend    Exercise:   Engage in moderate-to-vigorous aerobic activity for at least 40 minutes (on average) three to four times each week.    Wearables:   Activity tracker   Step tracker: getting 7,500 steps daily can cut your cardiac risks by 44%     Bone Health:   Https://www.nof.org/patients/treatment/nutrition/  Routine weight bearing exercise    Vaccines:   Flu vaccine every fall  https://www.vaccinateyourfamily.org/    COVID booster recommended: newest booster is now available at local pharmacies.   You can take the booster 2 months after previous booster.    If you had recent COVID infection: in general, you may take the booster when feeling well and up to 3 months after infection.   COVID resources: https://govstatus.[email or web address]¢/rglllus76    Note: you may take COVID booster along with flu vaccine unless contraindicated.       Mental Health:         Vaccines:     Shingles vaccine is given in TWO separate injections.   CDC recommends that healthy adults 50 years and older get two doses of the shingles vaccine called Shingrix (recombinant zoster vaccine),  by 2 to 6 months, to prevent shingles and the complications from the disease.

## 2023-09-30 LAB
ALBUMIN SERPL-MCNC: 4.8 G/DL (ref 3.5–5.2)
ALBUMIN/GLOB SERPL: 2.3 G/DL
ALP SERPL-CCNC: 125 U/L (ref 39–117)
ALT SERPL-CCNC: 36 U/L (ref 1–41)
AST SERPL-CCNC: 31 U/L (ref 1–40)
BILIRUB SERPL-MCNC: 0.9 MG/DL (ref 0–1.2)
BUN SERPL-MCNC: 11 MG/DL (ref 8–23)
BUN/CREAT SERPL: 12.4 (ref 7–25)
CALCIUM SERPL-MCNC: 9.6 MG/DL (ref 8.6–10.5)
CHLORIDE SERPL-SCNC: 106 MMOL/L (ref 98–107)
CHOLEST SERPL-MCNC: 148 MG/DL (ref 0–200)
CO2 SERPL-SCNC: 27.9 MMOL/L (ref 22–29)
CREAT SERPL-MCNC: 0.89 MG/DL (ref 0.76–1.27)
EGFRCR SERPLBLD CKD-EPI 2021: 96.3 ML/MIN/1.73
ERYTHROCYTE [DISTWIDTH] IN BLOOD BY AUTOMATED COUNT: 12.3 % (ref 12.3–15.4)
GLOBULIN SER CALC-MCNC: 2.1 GM/DL
GLUCOSE SERPL-MCNC: 103 MG/DL (ref 65–99)
HCT VFR BLD AUTO: 40.4 % (ref 37.5–51)
HDLC SERPL-MCNC: 30 MG/DL (ref 40–60)
HGB BLD-MCNC: 13.8 G/DL (ref 13–17.7)
LDLC SERPL CALC-MCNC: 87 MG/DL (ref 0–100)
LDLC/HDLC SERPL: 2.74 {RATIO}
MCH RBC QN AUTO: 31.1 PG (ref 26.6–33)
MCHC RBC AUTO-ENTMCNC: 34.2 G/DL (ref 31.5–35.7)
MCV RBC AUTO: 91 FL (ref 79–97)
PLATELET # BLD AUTO: 258 10*3/MM3 (ref 140–450)
POTASSIUM SERPL-SCNC: 4.4 MMOL/L (ref 3.5–5.2)
PROT SERPL-MCNC: 6.9 G/DL (ref 6–8.5)
PSA SERPL-MCNC: 0.41 NG/ML (ref 0–4)
RBC # BLD AUTO: 4.44 10*6/MM3 (ref 4.14–5.8)
SODIUM SERPL-SCNC: 143 MMOL/L (ref 136–145)
TRIGL SERPL-MCNC: 179 MG/DL (ref 0–150)
VLDLC SERPL CALC-MCNC: 31 MG/DL (ref 5–40)
WBC # BLD AUTO: 7.72 10*3/MM3 (ref 3.4–10.8)

## 2023-11-28 ENCOUNTER — OFFICE VISIT (OUTPATIENT)
Dept: CARDIOLOGY | Facility: CLINIC | Age: 63
End: 2023-11-28
Payer: COMMERCIAL

## 2023-11-28 VITALS
BODY MASS INDEX: 27.61 KG/M2 | HEART RATE: 60 BPM | HEIGHT: 69 IN | WEIGHT: 186.4 LBS | SYSTOLIC BLOOD PRESSURE: 138 MMHG | DIASTOLIC BLOOD PRESSURE: 82 MMHG

## 2023-11-28 DIAGNOSIS — E78.2 MIXED HYPERLIPIDEMIA: ICD-10-CM

## 2023-11-28 DIAGNOSIS — I25.118 CORONARY ARTERY DISEASE INVOLVING NATIVE CORONARY ARTERY OF NATIVE HEART WITH OTHER FORM OF ANGINA PECTORIS: Primary | ICD-10-CM

## 2023-11-28 DIAGNOSIS — I10 ESSENTIAL HYPERTENSION: ICD-10-CM

## 2023-11-28 PROCEDURE — 99214 OFFICE O/P EST MOD 30 MIN: CPT | Performed by: INTERNAL MEDICINE

## 2023-11-28 PROCEDURE — 93000 ELECTROCARDIOGRAM COMPLETE: CPT | Performed by: INTERNAL MEDICINE

## 2023-11-28 RX ORDER — ATORVASTATIN CALCIUM 80 MG/1
80 TABLET, FILM COATED ORAL DAILY
Qty: 90 TABLET | Refills: 3 | Status: SHIPPED | OUTPATIENT
Start: 2023-11-28

## 2023-11-28 NOTE — PROGRESS NOTES
[facility]      Patient Name: Mega Mckeon  :1960  Age: 63 y.o.  Encounter Provider:  Dominic Mckeon Jr, MD      Chief Complaint:   Chief Complaint   Patient presents with    Coronary artery disease involving native coronary artery of         HPI  Mega Mckeon is a 63 y.o. male past medical history of coronary artery disease status post inferior STEMI status post PCI to RCA, hypertension and dyslipidemia presents for initial evaluation.  Previously followed by Dr. Carcamo and I reviewed all pertinent electronic health records.  Patient had an inferior wall STEMI 2011 that required drug-eluting stent placement to the right coronary artery.  LV function was normal on echocardiogram at that time.  He had a follow-up stress test in 2021 that showed no evidence of myocardial ischemia.  Last seen by JOHN Marie and was doing well.  He works with the [employer].  Is a very physical job and he has no chest pain or shortness of air.  No orthopnea, PND or edema.  No palpitations, dizziness syncope.  Last LDL 87.  No cardiac complaints at time of interview.  Social family history is reviewed and is not pertinent to this clinic visit.      The following portions of the patient's history were reviewed and updated as appropriate: allergies, current medications, past family history, past medical history, past social history, past surgical history and problem list.      Review of Systems   Constitutional: Negative for chills and fever.   HENT:  Negative for hoarse voice and sore throat.    Eyes:  Negative for double vision and photophobia.   Cardiovascular:  Negative for chest pain, leg swelling, near-syncope, orthopnea, palpitations, paroxysmal nocturnal dyspnea and syncope.   Respiratory:  Negative for cough and wheezing.    Skin:  Negative for poor wound healing and rash.   Musculoskeletal:  Negative for arthritis and joint swelling.  "  Gastrointestinal:  Negative for bloating, abdominal pain, hematemesis and hematochezia.   Neurological:  Negative for dizziness and focal weakness.   Psychiatric/Behavioral:  Negative for depression and suicidal ideas.      OBJECTIVE:   Vital Signs  Vitals:    11/28/23 0856   BP: 138/82   Pulse: 60     Estimated body mass index is 27.53 kg/m² as calculated from the following:    Height as of this encounter: 175.3 cm (69\").    Weight as of this encounter: 84.6 kg (186 lb 6.4 oz).    Vitals reviewed.   Constitutional:       Appearance: Healthy appearance. Not in distress.   Neck:      Vascular: No JVR. JVD normal.   Pulmonary:      Effort: Pulmonary effort is normal.      Breath sounds: Normal breath sounds. No wheezing. No rhonchi. No rales.   Chest:      Chest wall: Not tender to palpatation.   Cardiovascular:      PMI at left midclavicular line. Normal rate. Regular rhythm. Normal S1. Normal S2.       Murmurs: There is no murmur.      No gallop.  No click. No rub.   Pulses:     Intact distal pulses.   Edema:     Peripheral edema absent.   Abdominal:      General: Bowel sounds are normal.      Palpations: Abdomen is soft.      Tenderness: There is no abdominal tenderness.   Musculoskeletal: Normal range of motion.         General: No tenderness. Skin:     General: Skin is warm and dry.   Neurological:      General: No focal deficit present.      Mental Status: Alert and oriented to person, place and time.       ECG 12 Lead    Date/Time: 11/28/2023 9:01 AM  Performed by: Dominic Mckeon Jr., MD    Authorized by: Dominic Mckeon Jr., MD  Comparison: compared with previous ECG from 11/28/2022  Similar to previous ECG  Rhythm: sinus rhythm    Clinical impression: normal ECG      Lipid Panel          9/29/2023    00:00   Lipid Panel   Total Cholesterol 148    Triglycerides 179    HDL Cholesterol 30    VLDL Cholesterol 31    LDL Cholesterol  87    LDL/HDL Ratio 2.74         BUN   Date Value Ref Range Status   09/29/2023 " 11 8 - 23 mg/dL Final   08/24/2016 23 (H) 6 - 20 mg/dL Final     Creatinine   Date Value Ref Range Status   09/29/2023 0.89 0.76 - 1.27 mg/dL Final   08/24/2016 1.93 (H) 0.76 - 1.27 mg/dL Final     Potassium   Date Value Ref Range Status   09/29/2023 4.4 3.5 - 5.2 mmol/L Final   08/24/2016 4.5 3.5 - 5.2 mmol/L Final     ALT (SGPT)   Date Value Ref Range Status   09/29/2023 36 1 - 41 U/L Final   10/09/2019 33 1 - 41 U/L Final     AST (SGOT)   Date Value Ref Range Status   09/29/2023 31 1 - 40 U/L Final   10/09/2019 27 1 - 40 U/L Final           ASSESSMENT:     63-year-old male with history of coronary artery disease and inferior wall MI with normal EF presents for routine evaluation      PLAN OF CARE:     CAD without angina -patient had very typical anginal equivalent of chest pressure with radiation to jaw at time of inferior MI.  He has not been having any angina.  LDL is not at goal so we will increase atorvastatin to 80 mg.  Continue aspirin and beta-blocker.  Essential hypertension -well-controlled at this time.  Continue to monitor closely.  Mixed hyperlipidemia -as above    Return to clinic 12 months             Discharge Medications            Accurate as of November 28, 2023  9:01 AM. If you have any questions, ask your nurse or doctor.                Continue These Medications        Instructions Start Date   aspirin 81 MG tablet   81 mg, Oral, Daily      atorvastatin 40 MG tablet  Commonly known as: LIPITOR   40 mg, Oral, Daily, MUST MAKE FOLLOW UP APPOINTMENT FOR FUTURE REFILLS      metoprolol tartrate 25 MG tablet  Commonly known as: LOPRESSOR   12.5 mg, Oral, Daily, MUST MAKE APPOINTMENT FOR FUTURE REFILLS               Thank you for allowing me to participate in the care of your patient,      Sincerely,   Dominic Mckeon MD  [facility]  11/28/23  09:01 EST

## 2024-02-21 NOTE — PROGRESS NOTES
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  His past medical history as listed below and reviewed by me        Allergies: No Known Allergies    Medications:   Home Medications:  Current Outpatient Medications on File Prior to Visit   Medication Sig    aspirin 81 MG tablet Take 1 tablet by mouth Daily.    atorvastatin (LIPITOR) 80 MG tablet Take 1 tablet by mouth Daily.    metoprolol tartrate (LOPRESSOR) 25 MG tablet TAKE 1/2 TABLET BY MOUTH DAILY     No current facility-administered medications on file prior to visit.     Current Medications:  Scheduled Meds:  Continuous Infusions:No current facility-administered medications for this visit.    PRN Meds:.    Past Medical History:   Diagnosis Date    Arthritis     Atypical chest pain 11/04/2018    CAD (coronary artery disease)     Heart disease     History of kidney stones     History of measles     History of mumps     Hyperlipidemia     Ischemic cardiomyopathy     Kidney stone     Knee swelling 2017    Myocardial infarction     status: post drug eluting stent to the right coronary artery    Tendinitis of knee 2017    Whooping cough         Past Surgical History:   Procedure Laterality Date    COLONOSCOPY  05/08/2015    Dr Stevens     CORONARY ANGIOPLASTY WITH STENT PLACEMENT  02/11/2011    RCA        Social History     Occupational History    Not on file   Tobacco Use     "Smoking status: Never    Smokeless tobacco: Never    Tobacco comments:     caffeine use   Substance and Sexual Activity    Alcohol use: Yes     Alcohol/week: 2.0 standard drinks of alcohol     Types: 2 Cans of beer per week     Comment: 2-3 beers weekly    Drug use: No    Sexual activity: Yes     Partners: Female     Birth control/protection: Post-menopausal, Vasectomy      Social History     Social History Narrative    Self employed - works in concrete         Family History   Problem Relation Age of Onset    Diabetes Father     Diabetes Mother              Review of Systems:     Review of Systems      Physical Exam: 64 y.o. male  General Appearance:    Alert, cooperative, in no acute distress                   Vitals:    02/22/24 0811   Temp: 98.3 °F (36.8 °C)   Weight: 82.7 kg (182 lb 4.8 oz)   Height: 175.3 cm (69\")   PainSc:   6      Patient is alert and read ×3 no acute distress appears her above-listed at height weight and age.  Affect is normal respiratory rate is normal unlabored. Heart rate regular rate rhythm, sclera, dentition and hearing are normal for the purpose of this exam.        Ortho Exam  Physical exam of the right knee reveals no effusion, no erythema.  The patient has no palpable tenderness along the medial joint line, no tenderness about the lateral joint line.  Patient does have crepitus with patellofemoral range of motion.  They also have subjective symptoms anteriorly during exam.  The patient has a negative bounce home, negative Pawel and a stable ligamentous exam.  Quad tone is reasonable and symmetric.  There are no overlying skin changes no lymphedema no lymphadenopathy.  There is good hip range of motion which is full symmetric and asymptomatic and a normal ankle exam.  Hamstrings and IT band are tight bilaterally.   .examknee  Large Joint Arthrocentesis: R knee  Date/Time: 2/22/2024 8:23 AM  Consent given by: patient  Site marked: site marked  Timeout: Immediately prior to " procedure a time out was called to verify the correct patient, procedure, equipment, support staff and site/side marked as required   Supporting Documentation  Indications: pain   Procedure Details  Location: knee - R knee  Preparation: Patient was prepped and draped in the usual sterile fashion  Needle gauge: 21 G.  Approach: anteromedial  Medications administered: 2 mL lidocaine PF 1% 1 %; 80 mg methylPREDNISolone acetate 80 MG/ML  Patient tolerance: patient tolerated the procedure well with no immediate complications      Large Joint Arthrocentesis: L knee  Date/Time: 2/22/2024 8:23 AM  Consent given by: patient  Site marked: site marked  Timeout: Immediately prior to procedure a time out was called to verify the correct patient, procedure, equipment, support staff and site/side marked as required   Supporting Documentation  Indications: pain   Procedure Details  Location: knee - L knee  Preparation: Patient was prepped and draped in the usual sterile fashion  Needle gauge: 21 G.  Approach: anteromedial  Medications administered: 2 mL lidocaine PF 1% 1 %; 80 mg methylPREDNISolone acetate 80 MG/ML  Patient tolerance: patient tolerated the procedure well with no immediate complications                 Radiology:   AP, Lateral and merchant views of the right and left knee  were ordered/reviewed to evauateknee pain.  I have compared x-rays done in 2017 he has significant patellofemoral arthritis which has progressed no other acute pathology  Imaging Results (Most Recent)       Procedure Component Value Units Date/Time    XR Knee 3 View Bilateral [[id number]] Resulted: 02/22/24 0804     Updated: 02/22/24 0804    Impression:      Ordering physician's impression is located in the Encounter Note dated 02/22/24. X-ray performed in the DR room.            Assessment/Plan:    Bilateral knee pain I think this is mostly from his patellofemoral joint some of his pain is medial so meniscus tear has to be in the differential plan  is to proceed with an injection as a diagnostic and therapeutic tool I will also start him on some meloxicam with strict cautions for short period of time talked about the importance of quad and core strengthening and stretching we talked about exercises for a physician guided home exercise program I think he will get better from this should he not we would consider other means of testing                                  Answers submitted by the patient for this visit:  Primary Reason for Visit (Submitted on 2/21/2024)  What is the primary reason for your visit?: Extremity Pain  Lower Extremity Injury Questionnaire (Submitted on 2/21/2024)  Chief Complaint: Extremity pain  Injury: No  Pain location: left knee, right knee  Pain quality: aching, other  Pain - numeric: 7/10  Progression since onset: comes and goes  tingling: No  inability to bear weight: Yes  lower extremity swelling: Yes  redness: No

## 2024-02-22 ENCOUNTER — OFFICE VISIT (OUTPATIENT)
Dept: ORTHOPEDIC SURGERY | Facility: CLINIC | Age: 64
End: 2024-02-22
Payer: COMMERCIAL

## 2024-02-22 VITALS — HEIGHT: 69 IN | WEIGHT: 182.3 LBS | BODY MASS INDEX: 27 KG/M2 | TEMPERATURE: 98.3 F

## 2024-02-22 DIAGNOSIS — M17.10 PATELLOFEMORAL ARTHRITIS: ICD-10-CM

## 2024-02-22 DIAGNOSIS — M25.561 PAIN IN BOTH KNEES, UNSPECIFIED CHRONICITY: Primary | ICD-10-CM

## 2024-02-22 DIAGNOSIS — M25.562 PAIN IN BOTH KNEES, UNSPECIFIED CHRONICITY: Primary | ICD-10-CM

## 2024-02-22 RX ORDER — METHYLPREDNISOLONE ACETATE 80 MG/ML
80 INJECTION, SUSPENSION INTRA-ARTICULAR; INTRALESIONAL; INTRAMUSCULAR; SOFT TISSUE
Status: COMPLETED | OUTPATIENT
Start: 2024-02-22 | End: 2024-02-22

## 2024-02-22 RX ORDER — LIDOCAINE HYDROCHLORIDE 10 MG/ML
2 INJECTION, SOLUTION EPIDURAL; INFILTRATION; INTRACAUDAL; PERINEURAL
Status: COMPLETED | OUTPATIENT
Start: 2024-02-22 | End: 2024-02-22

## 2024-02-22 RX ORDER — MELOXICAM 15 MG/1
TABLET ORAL
Qty: 30 TABLET | Refills: 0 | Status: SHIPPED | OUTPATIENT
Start: 2024-02-22

## 2024-02-22 RX ADMIN — LIDOCAINE HYDROCHLORIDE 2 ML: 10 INJECTION, SOLUTION EPIDURAL; INFILTRATION; INTRACAUDAL; PERINEURAL at 08:23

## 2024-02-22 RX ADMIN — METHYLPREDNISOLONE ACETATE 80 MG: 80 INJECTION, SUSPENSION INTRA-ARTICULAR; INTRALESIONAL; INTRAMUSCULAR; SOFT TISSUE at 08:23

## 2024-05-15 ENCOUNTER — OFFICE VISIT (OUTPATIENT)
Dept: INTERNAL MEDICINE | Facility: CLINIC | Age: 64
End: 2024-05-15
Payer: COMMERCIAL

## 2024-05-15 VITALS
OXYGEN SATURATION: 99 % | HEART RATE: 62 BPM | DIASTOLIC BLOOD PRESSURE: 82 MMHG | HEIGHT: 69 IN | BODY MASS INDEX: 26.54 KG/M2 | SYSTOLIC BLOOD PRESSURE: 130 MMHG | WEIGHT: 179.2 LBS

## 2024-05-15 DIAGNOSIS — L03.115 CELLULITIS OF RIGHT LOWER EXTREMITY: Primary | ICD-10-CM

## 2024-05-15 PROCEDURE — 99213 OFFICE O/P EST LOW 20 MIN: CPT | Performed by: NURSE PRACTITIONER

## 2024-05-15 RX ORDER — SULFAMETHOXAZOLE AND TRIMETHOPRIM 800; 160 MG/1; MG/1
1 TABLET ORAL 2 TIMES DAILY
Qty: 14 TABLET | Refills: 0 | Status: SHIPPED | OUTPATIENT
Start: 2024-05-15 | End: 2024-05-22

## 2024-05-15 NOTE — PROGRESS NOTES
"        Chief Complaint  Cyst (Right leg red and noticed it a week ago )     Subjective:      History of Present Illness {CC  Problem List  Visit  Diagnosis   Encounters  Notes  Medications  Labs  Result Review Imaging  Media :23}     eMga Mckeon presents to [facility] for:        Right lower extremity: cyst right lower leg.  States was red, not open or draining.  Not sure if had injury. No fever or chills.   Works with [employer]    Cardiology: increased lipitor to 80 mg daily.       Answers submitted by the patient for this visit:  Primary Reason for Visit (Submitted on 5/14/2024)  What is the primary reason for your visit?: Other  Other (Submitted on 5/14/2024)  Please describe your symptoms.: Knot in calf if right leg  Have you had these symptoms before?: No  How long have you been having these symptoms?: 5-7 days  Please list any medications you are currently taking for this condition.: None  Please describe any probable cause for these symptoms. : Unknown        I have reviewed patient's medical history, any new submitted information provided by patient or medical assistant and updated medical record.      Objective:      Physical Exam  Skin:              Result Review  Data Reviewed:{ Labs  Result Review  Imaging  Med Tab  Media :23}                Vital Signs:   /82 (BP Location: Left arm, Patient Position: Sitting, Cuff Size: Adult)   Pulse 62   Ht 175.3 cm (69\")   Wt 81.3 kg (179 lb 3.2 oz)   SpO2 99%   BMI 26.46 kg/m²   Estimated body mass index is 26.46 kg/m² as calculated from the following:    Height as of this encounter: 175.3 cm (69\").    Weight as of this encounter: 81.3 kg (179 lb 3.2 oz).        Requested Prescriptions     Signed Prescriptions Disp Refills    sulfamethoxazole-trimethoprim (Bactrim DS) 800-160 MG per tablet 14 tablet 0     Sig: Take 1 tablet by mouth 2 (Two) Times a Day for 7 days.       Routine medications provided by " this office will also be refilled via pharmacy request.       Current Outpatient Medications:     aspirin 81 MG tablet, Take 1 tablet by mouth Daily., Disp: 30 tablet, Rfl: 11    atorvastatin (LIPITOR) 80 MG tablet, Take 1 tablet by mouth Daily., Disp: 90 tablet, Rfl: 3    metoprolol tartrate (LOPRESSOR) 25 MG tablet, TAKE 1/2 TABLET BY MOUTH DAILY, Disp: 45 tablet, Rfl: 11    sulfamethoxazole-trimethoprim (Bactrim DS) 800-160 MG per tablet, Take 1 tablet by mouth 2 (Two) Times a Day for 7 days., Disp: 14 tablet, Rfl: 0     Assessment and Plan:      Assessment and Plan {CC Problem List  Visit Diagnosis  ROS  Review (Popup)  Health Maintenance  Quality  BestPractice  Medications  SmartSets  SnapShot Encounters  Media :23}     Diagnoses and all orders for this visit:    1. Cellulitis of right lower extremity (Primary)  -     sulfamethoxazole-trimethoprim (Bactrim DS) 800-160 MG per tablet; Take 1 tablet by mouth 2 (Two) Times a Day for 7 days.  Dispense: 14 tablet; Refill: 0             New Medications Ordered This Visit   Medications    sulfamethoxazole-trimethoprim (Bactrim DS) 800-160 MG per tablet     Sig: Take 1 tablet by mouth 2 (Two) Times a Day for 7 days.     Dispense:  14 tablet     Refill:  0       I discussed medication use, dosing and possible side effects.   Patient was given opportunity to ask any questions.      Follow Up {Instructions Charge Capture  Follow-up Communications :23}     Return in about 4 weeks (around 6/12/2024), or if symptoms worsen or fail to improve, for fu lipids and check A1C.      Patient was given instructions and counseling regarding his condition or for health maintenance advice. Please see specific information pulled into the AVS if appropriate.    Dragon disclaimer:   Much of this encounter note is an electronic transcription/translation of spoken language to printed text. The electronic translation of spoken language may permit erroneous, or at times,  nonsensical words or phrases to be inadvertently transcribed; Although I have reviewed the note for such errors, some may still exist.     Additional Patient Counseling:       There are no Patient Instructions on file for this visit.

## 2024-06-12 ENCOUNTER — OFFICE VISIT (OUTPATIENT)
Dept: INTERNAL MEDICINE | Facility: CLINIC | Age: 64
End: 2024-06-12
Payer: COMMERCIAL

## 2024-06-12 VITALS
SYSTOLIC BLOOD PRESSURE: 120 MMHG | HEIGHT: 69 IN | BODY MASS INDEX: 26.16 KG/M2 | DIASTOLIC BLOOD PRESSURE: 80 MMHG | OXYGEN SATURATION: 98 % | HEART RATE: 61 BPM | WEIGHT: 176.6 LBS

## 2024-06-12 DIAGNOSIS — I25.10 CORONARY ARTERY DISEASE INVOLVING NATIVE CORONARY ARTERY OF NATIVE HEART WITHOUT ANGINA PECTORIS: Chronic | ICD-10-CM

## 2024-06-12 DIAGNOSIS — I10 ESSENTIAL HYPERTENSION: Primary | Chronic | ICD-10-CM

## 2024-06-12 DIAGNOSIS — Q80.9 XERODERMA: ICD-10-CM

## 2024-06-12 DIAGNOSIS — R73.03 PREDIABETES: Chronic | ICD-10-CM

## 2024-06-12 DIAGNOSIS — E78.2 MIXED HYPERLIPIDEMIA: Chronic | ICD-10-CM

## 2024-06-12 LAB
ALBUMIN SERPL-MCNC: 4.5 G/DL (ref 3.5–5.2)
ALBUMIN/GLOB SERPL: 2.4 G/DL
ALP SERPL-CCNC: 121 U/L (ref 39–117)
ALT SERPL-CCNC: 31 U/L (ref 1–41)
AST SERPL-CCNC: 35 U/L (ref 1–40)
BILIRUB SERPL-MCNC: 0.7 MG/DL (ref 0–1.2)
BUN SERPL-MCNC: 18 MG/DL (ref 8–23)
BUN/CREAT SERPL: 22 (ref 7–25)
CALCIUM SERPL-MCNC: 9.4 MG/DL (ref 8.6–10.5)
CHLORIDE SERPL-SCNC: 104 MMOL/L (ref 98–107)
CHOLEST SERPL-MCNC: 118 MG/DL (ref 0–200)
CO2 SERPL-SCNC: 25.7 MMOL/L (ref 22–29)
CREAT SERPL-MCNC: 0.82 MG/DL (ref 0.76–1.27)
EGFRCR SERPLBLD CKD-EPI 2021: 98.1 ML/MIN/1.73
ERYTHROCYTE [DISTWIDTH] IN BLOOD BY AUTOMATED COUNT: 11.9 % (ref 12.3–15.4)
GLOBULIN SER CALC-MCNC: 1.9 GM/DL
GLUCOSE SERPL-MCNC: 109 MG/DL (ref 65–99)
HBA1C MFR BLD: 5.7 % (ref 4.8–5.6)
HCT VFR BLD AUTO: 41.5 % (ref 37.5–51)
HDLC SERPL-MCNC: 36 MG/DL (ref 40–60)
HGB BLD-MCNC: 14.1 G/DL (ref 13–17.7)
LDLC SERPL CALC-MCNC: 69 MG/DL (ref 0–100)
LDLC/HDLC SERPL: 1.95 {RATIO}
MCH RBC QN AUTO: 32 PG (ref 26.6–33)
MCHC RBC AUTO-ENTMCNC: 34 G/DL (ref 31.5–35.7)
MCV RBC AUTO: 94.1 FL (ref 79–97)
PLATELET # BLD AUTO: 253 10*3/MM3 (ref 140–450)
POTASSIUM SERPL-SCNC: 4.4 MMOL/L (ref 3.5–5.2)
PROT SERPL-MCNC: 6.4 G/DL (ref 6–8.5)
RBC # BLD AUTO: 4.41 10*6/MM3 (ref 4.14–5.8)
SODIUM SERPL-SCNC: 138 MMOL/L (ref 136–145)
TRIGL SERPL-MCNC: 59 MG/DL (ref 0–150)
VLDLC SERPL CALC-MCNC: 13 MG/DL (ref 5–40)
WBC # BLD AUTO: 7.53 10*3/MM3 (ref 3.4–10.8)

## 2024-06-12 PROCEDURE — 99214 OFFICE O/P EST MOD 30 MIN: CPT | Performed by: NURSE PRACTITIONER

## 2024-06-12 NOTE — PROGRESS NOTES
Chief Complaint  Hypertension     Subjective:      History of Present Illness {CC  Problem List  Visit  Diagnosis   Encounters  Notes  Medications  Labs  Result Review Imaging  Media :23}     Mega Mckeon presents to [facility] for:  Hypertension, coronary artery disease (inferior MI ELY R coronary artery 2011), hyperlipidemia, OA (stable - no longer on nsaids), (prior kidney stone - nonobstructive stone on left, sees Dr Ramachandran)         Hypertension   This is a chronic problem. The current episode started more than 1 year ago. The problem is controlled. Pertinent negatives include no chest pain, headaches, palpitations, peripheral edema or shortness of breath. There are no associated agents to hypertension. Risk factors for coronary artery disease include male gender and dyslipidemia. Past treatments include beta blockers. Current antihypertension treatment includes beta blockers. There are no compliance problems.  Hypertensive end-organ damage includes CAD/MI.      Hyperlipidemia   This is a chronic problem. Episode onset: 2011. The problem is controlled. Recent lipid tests were reviewed and are normal. Factors aggravating his hyperlipidemia include beta blockers. Pertinent negatives include no chest pain or shortness of breath. Current antihyperlipidemic treatment includes statins: continues lipitor 80 mg daily.   The current treatment provides significant improvement of lipids. There are no compliance problems.    Risk factors for coronary artery disease include hypertension, male sex and dyslipidemia.      Coronary Artery Disease   Presents for follow-up (MI 2011 ) visit. Pertinent negatives include no chest pain, leg swelling, palpitations or shortness of breath. Risk factors include hyperlipidemia. The symptoms have been stable. Compliance with diet is variable. Compliance with exercise is good. Compliance with medications is good.      He has been getting  bumps on legs - states red at times when he picks at them but then goes away.  Does not itch.   One was infected at last visit - states resolved.   No fever or chills.   Works in [employer].   States doesn't use lotion.       Answers submitted by the patient for this visit:  Primary Reason for Visit (Submitted on 6/7/2024)  What is the primary reason for your visit?: Other  Other (Submitted on 6/7/2024)  Please describe your symptoms.: numerous hair follicle infections, and possible 2-3 cyst in legs  Have you had these symptoms before?: No  How long have you been having these symptoms?: Greater than 2 weeks  Please list any medications you are currently taking for this condition.: none  Please describe any probable cause for these symptoms. : unknown      I have reviewed patient's medical history, any new submitted information provided by patient or medical assistant and updated medical record.      Objective:      Physical Exam  Constitutional:       Appearance: Normal appearance.   Cardiovascular:      Rate and Rhythm: Normal rate and regular rhythm.      Pulses: Normal pulses.      Heart sounds: Normal heart sounds.   Pulmonary:      Effort: Pulmonary effort is normal.      Breath sounds: Normal breath sounds.   Skin:     General: Skin is dry.      Comments: A few ingrown hairs over areas with more friction: not red or inflammed.    Neurological:      Mental Status: He is alert.        Result Review  Data Reviewed:{ Labs  Result Review  Imaging  Med Tab  Media :23}     The following data was reviewed by: Marizol Palomo III, NP-C on 06/12/2024  Common labs          9/29/2023    00:00   Common Labs   Glucose 103    BUN 11    Creatinine 0.89    Sodium 143    Potassium 4.4    Chloride 106    Calcium 9.6    Total Protein 6.9    Albumin 4.8    Total Bilirubin 0.9    Alkaline Phosphatase 125    AST (SGOT) 31    ALT (SGPT) 36    WBC 7.72    Hemoglobin 13.8    Hematocrit 40.4    Platelets 258    Total  "Cholesterol 148    Triglycerides 179    HDL Cholesterol 30    LDL Cholesterol  87    PSA 0.412             Vital Signs:   /80 (BP Location: Left arm, Patient Position: Sitting, Cuff Size: Adult)   Pulse 61   Ht 175.3 cm (69\")   Wt 80.1 kg (176 lb 9.6 oz)   SpO2 98%   BMI 26.08 kg/m²   Estimated body mass index is 26.08 kg/m² as calculated from the following:    Height as of this encounter: 175.3 cm (69\").    Weight as of this encounter: 80.1 kg (176 lb 9.6 oz).        Requested Prescriptions      No prescriptions requested or ordered in this encounter       Routine medications provided by this office will also be refilled via pharmacy request.       Current Outpatient Medications:     aspirin 81 MG tablet, Take 1 tablet by mouth Daily., Disp: 30 tablet, Rfl: 11    atorvastatin (LIPITOR) 80 MG tablet, Take 1 tablet by mouth Daily., Disp: 90 tablet, Rfl: 3    metoprolol tartrate (LOPRESSOR) 25 MG tablet, TAKE 1/2 TABLET BY MOUTH DAILY, Disp: 45 tablet, Rfl: 11     Assessment and Plan:      Assessment and Plan {CC Problem List  Visit Diagnosis  ROS  Review (Popup)  Health Maintenance  Quality  BestPractice  Medications  SmartSets  SnapShot Encounters  Media :23}     Diagnoses and all orders for this visit:    1. Essential hypertension (Primary)  Overview:  Current treatment:  Lopressor 25 mg twice a day    Assessment & Plan:  Hypertension is improving with treatment.  Continue current treatment regimen.  Dietary sodium restriction.  Regular aerobic exercise.  Continue current medications.  Blood pressure will be reassessed at the next regular appointment.      2. Coronary artery disease involving native coronary artery of native heart without angina pectoris  Overview:  Inferior MI s/p ELY R coronary artery 2011  *Continue ASA and Statin    Orders:  -     Comprehensive Metabolic Panel  -     Lipid Panel With LDL / HDL Ratio    3. Mixed hyperlipidemia  Overview:  Current treatment:  lipitor 80 mg " daily     Assessment & Plan:   Lipid abnormalities are improving with treatment    Plan:  Continue same medication/s without change.      Discussed medication dosage, use, side effects, and goals of treatment in detail.    Counseled patient on lifestyle modifications to help control hyperlipidemia.     Patient Treatment Goals:   LDL goal is less than 70  Lab Results   Component Value Date    CHOL 123 10/09/2019    CHLPL 148 09/29/2023    TRIG 179 (H) 09/29/2023    HDL 30 (L) 09/29/2023    LDL 87 09/29/2023        Followup in 6 months.          Orders:  -     Comprehensive Metabolic Panel  -     Lipid Panel With LDL / HDL Ratio  -     CBC (No Diff)    4. Prediabetes  Overview:  Limit sugar and carb intake    Orders:  -     Hemoglobin A1c    5. Xeroderma  Comments:  Advised nightly cerave cream      Advised Cerave cream daily for skin hydration.          No orders of the defined types were placed in this encounter.          Follow Up {Instructions Charge Capture  Follow-up Communications :23}     Return in about 6 months (around 12/12/2024) for Annual physical.      Patient was given instructions and counseling regarding his condition or for health maintenance advice. Please see specific information pulled into the AVS if appropriate.    Dragon disclaimer:   Much of this encounter note is an electronic transcription/translation of spoken language to printed text. The electronic translation of spoken language may permit erroneous, or at times, nonsensical words or phrases to be inadvertently transcribed; Although I have reviewed the note for such errors, some may still exist.     Additional Patient Counseling:       Patient Instructions     Summer Health Information:     Stay hydrated when outside  If your urine starts to get darker, you are not drinking enough     Protect yourself from ticks and mosquitos  Here are the best ingredients to look for:  DEET (N,N-diethyl-m-toluamide or  N,N-diethyl-3-methyl-benzamide)  Picaridin  Oil of lemon eucalyptus (p-menthane-3,8-diol or PMD)  Wear light-colored pants so you can spot ticks easier  If you use a permethrin product, ONLY apply to clothing    Practice Safe Sun!    Use sun screen SPF >50 daily, reapply regularly per directions on package  See dermatologist for skin check regularly  Protect your eyes with sunglasses with UV protection    Poison Ivy  If you are going into areas that may have poison ivy, prepare with a product like IvyX or other ivy blocker.  Wash your clothes and pets after being in an area with ivy when returning home. If you come in contact with poison ivy, try a product like Technu     Other things you should incorporate all year...     Diet:    Eat vegetables, fruits, whole grain, low-fat dairy, poultry, fish, beans, nontropical vegetable oils, and nuts, but avoid red meat (i.e., Mediterranean-style diet, DASH [Dietary Approaches to Stop Hypertension] diet).  Limit sugary drinks and sweets.  Limit saturated and trans fat to 5% to 6% of calories.  Limit sodium intake to 2,400 mg daily (about one teaspoon table salt [kosher/sea salt have less sodium per teaspoon]).  https://www.eatright.org/    Weight loss / Calorie Counting Apps:    Lose It!   Leho Pal   Works great when you try it with a partner/ friend. It takes about 15 minutes a day but studies show that this simple method of monitoring your intake can help you achieve goals as it keeps you accountable.  I often will ask patients to try these apps just to get an idea of how much sodium and how many carbohydrates you are taking in.   Exercise:   Engage in moderate-to-vigorous aerobic activity for at least 40 minutes (on average) three to four times each week.  Wearables:   Activity tracker   Step tracker: getting 7,500 steps daily can cut your cardiac risks by 44%   Bone Health:   [email or web address]/[email or web address]  Routine weight bearing exercise

## 2024-06-12 NOTE — ASSESSMENT & PLAN NOTE
Lipid abnormalities are improving with treatment    Plan:  Continue same medication/s without change.      Discussed medication dosage, use, side effects, and goals of treatment in detail.    Counseled patient on lifestyle modifications to help control hyperlipidemia.     Patient Treatment Goals:   LDL goal is less than 70  Lab Results   Component Value Date    CHOL 123 10/09/2019    CHLPL 148 09/29/2023    TRIG 179 (H) 09/29/2023    HDL 30 (L) 09/29/2023    LDL 87 09/29/2023        Followup in 6 months.

## 2024-06-12 NOTE — PATIENT INSTRUCTIONS
Summer Health Information:     Stay hydrated when outside  If your urine starts to get darker, you are not drinking enough     Protect yourself from ticks and mosquitos  Here are the best ingredients to look for:  DEET (N,N-diethyl-m-toluamide or N,N-diethyl-3-methyl-benzamide)  Picaridin  Oil of lemon eucalyptus (p-menthane-3,8-diol or PMD)  Wear light-colored pants so you can spot ticks easier  If you use a permethrin product, ONLY apply to clothing    Practice Safe Sun!    Use sun screen SPF >50 daily, reapply regularly per directions on package  See dermatologist for skin check regularly  Protect your eyes with sunglasses with UV protection    Poison Ivy  If you are going into areas that may have poison ivy, prepare with a product like IvyX or other ivy blocker.  Wash your clothes and pets after being in an area with ivy when returning home. If you come in contact with poison ivy, try a product like Technu     Other things you should incorporate all year...     Diet:    Eat vegetables, fruits, whole grain, low-fat dairy, poultry, fish, beans, nontropical vegetable oils, and nuts, but avoid red meat (i.e., Mediterranean-style diet, DASH [Dietary Approaches to Stop Hypertension] diet).  Limit sugary drinks and sweets.  Limit saturated and trans fat to 5% to 6% of calories.  Limit sodium intake to 2,400 mg daily (about one teaspoon table salt [kosher/sea salt have less sodium per teaspoon]).  https://www.eatright.org/    Weight loss / Calorie Counting Apps:    Lose It!   [employer] Pal   Works great when you try it with a partner/ friend. It takes about 15 minutes a day but studies show that this simple method of monitoring your intake can help you achieve goals as it keeps you accountable.  I often will ask patients to try these apps just to get an idea of how much sodium and how many carbohydrates you are taking in.   Exercise:   Engage in moderate-to-vigorous aerobic activity for at least 40 minutes (on  average) three to four times each week.  Wearables:   Activity tracker   Step tracker: getting 7,500 steps daily can cut your cardiac risks by 44%   Bone Health:   [email or web address]/[email or web address]  Routine weight bearing exercise

## 2024-12-04 ENCOUNTER — OFFICE VISIT (OUTPATIENT)
Dept: CARDIOLOGY | Facility: CLINIC | Age: 64
End: 2024-12-04
Payer: COMMERCIAL

## 2024-12-04 VITALS
HEART RATE: 59 BPM | DIASTOLIC BLOOD PRESSURE: 78 MMHG | BODY MASS INDEX: 27.4 KG/M2 | SYSTOLIC BLOOD PRESSURE: 160 MMHG | HEIGHT: 69 IN | WEIGHT: 185 LBS

## 2024-12-04 DIAGNOSIS — R73.03 PREDIABETES: Chronic | ICD-10-CM

## 2024-12-04 DIAGNOSIS — I10 ESSENTIAL HYPERTENSION: ICD-10-CM

## 2024-12-04 DIAGNOSIS — E78.2 MIXED HYPERLIPIDEMIA: ICD-10-CM

## 2024-12-04 DIAGNOSIS — I25.118 CORONARY ARTERY DISEASE INVOLVING NATIVE CORONARY ARTERY OF NATIVE HEART WITH OTHER FORM OF ANGINA PECTORIS: Primary | ICD-10-CM

## 2024-12-04 PROCEDURE — 93000 ELECTROCARDIOGRAM COMPLETE: CPT | Performed by: NURSE PRACTITIONER

## 2024-12-04 PROCEDURE — 99214 OFFICE O/P EST MOD 30 MIN: CPT | Performed by: NURSE PRACTITIONER

## 2024-12-04 RX ORDER — ATORVASTATIN CALCIUM 80 MG/1
80 TABLET, FILM COATED ORAL DAILY
Qty: 90 TABLET | Refills: 3 | Status: SHIPPED | OUTPATIENT
Start: 2024-12-04

## 2024-12-04 NOTE — PROGRESS NOTES
"Date of Office Visit: 24  Encounter Provider: DEEJAY Kong  Place of Service: [facility] CARDIOLOGY  Patient Name: Mega Mckeon  :1960    Chief Complaint   Patient presents with    Coronary artery disease involving native coronary artery of    Follow-up   :     HPI: Mega Mckeon is a 64 y.o. male  with hypertension, hyperlipidemia, coronary artery disease, previous inferior myocardial infarction and drug-eluting stent to the right coronary artery.          He is a former patient of Dr. Burak Carcamo.     He is now followed by Dr. Dominic Mckeon.  I will visit with him in follow-up and have reviewed his medical record.     He had normal LV systolic function At the time of inferior wall myocardial infarction in 2011 that necessitated drug-eluting stent to the right coronary artery.  He had a normal ECG stress test 2021.      He presents today for reassessment.  He has no chest pain or shortness of breath or edema or dizziness or palpitation.  He denies blood in the urine or stool.  He has not taken metoprolol this morning but states his blood pressure is normally lower.  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  Otherwise, no structured exercise outside of that.        No Known Allergies        Family and social history reviewed.     ROS  All other systems were reviewed and are negative          Objective:     Vitals:    24 0900   BP: 160/78   BP Location: Left arm   Patient Position: Sitting   Cuff Size: Adult   Pulse: 59   Weight: 83.9 kg (185 lb)   Height: 175.3 cm (69\")     Body mass index is 27.32 kg/m².    PHYSICAL EXAM:  Pulmonary:      Effort: Pulmonary effort is normal.      Breath sounds: Normal breath sounds.   Cardiovascular:      Normal rate. Regular rhythm.           ECG 12 Lead    Date/Time: 2024 9:26 AM  Performed by: Charlotte Marie APRN    Authorized by: Charlotte Marie APRN  Comparison: compared " with previous ECG   Similar to previous ECG  Rhythm: sinus rhythm  Rate: normal  T inversion: III  T flattening: aVF  QRS axis: normal  Comments: No change from prior             Current Outpatient Medications   Medication Sig Dispense Refill    aspirin 81 MG tablet Take 1 tablet by mouth Daily. 30 tablet 11    atorvastatin (LIPITOR) 80 MG tablet Take 1 tablet by mouth Daily. 90 tablet 3    metoprolol tartrate (LOPRESSOR) 25 MG tablet TAKE 1/2 TABLET BY MOUTH DAILY 45 tablet 11     No current facility-administered medications for this visit.     Assessment:       Diagnosis Plan   1. Coronary artery disease involving native coronary artery of native heart with other form of angina pectoris        2. Essential hypertension        3. Mixed hyperlipidemia        4. Prediabetes             No orders of the defined types were placed in this encounter.        Plan:       1.  64-year-old gentleman with coronary artery disease, inferior myocardial infarction 2011 status post right coronary artery stent.  He had normal treadmill ECG December 2021 he has no angina or dyspnea on exertion.    -He is staying active with work however I recommend some routine exercise in addition to that.  He will continue beta-blocker, aspirin and maximum dose atorvastatin.  2.  Hyperlipidemia on atorvastatin 80 mg target LDL 70 or less.  Reviewed most recent lipid panel with LDL Of 69 in June.  Will continue the same  3.  Hypertension-blood pressure is elevated today in clinic but he has not had half tablet metoprolol yet.  I reviewed blood pressure readings from his PCP office in May as well as June of this year which were better.  I did not recommend any changes at this time.  4.  History of asymptomatic PVCs-denies palpitations  5.  History of prediabetes most recent hemoglobin A1c of 5.7 in 06/2024      He was advised to call with any questions or concerns otherwise follow-up in 1 year.          It has been a pleasure to participate in this  patient's care.      Thank you,  DEEJAY Kong      **I used Dragon to dictate this note:**

## 2024-12-10 NOTE — PROGRESS NOTES
Knee Follow Up      Patient: Mega Mckeon        YOB: 1960            Chief Complaints: knee pain bilateral      History of Present Illness: This patient presents complaining of bilateral knee pain right greater than left it has been ongoing for the last month or so.  I saw him almost a year ago for both knees he denies any new history injury he does report some swelling in the right and also some swelling in the very front of the knee on the right he had a couple days where he can put weight on it once or the right 1 woke him up at night      Physical Exam: 64 y.o. male  General Appearance:    Alert, cooperative, in no acute distress                 There were no vitals filed for this visit.     Patient is alert and read ×3 no acute distress appears her above-listed at height weight and age.  Affect is normal respiratory rate is normal unlabored. Heart rate regular rate rhythm, sclera, dentition and hearing are normal for the purpose of this exam.      Ortho Exam  Physical exam of the right knee reveals no effusion, no erythema.  The patient has no palpable tenderness along the medial joint line, no tenderness about the lateral joint line.  Patient does have crepitus with patellofemoral range of motion.  They also have subjective symptoms anteriorly during exam.  The patient has a negative bounce home, negative Pawel and a stable ligamentous exam.  Quad tone is reasonable and symmetric.  There are no overlying skin changes no lymphedema no lymphadenopathy.  There is good hip range of motion which is full symmetric and asymptomatic and a normal ankle exam.  Hamstrings and IT band are tight bilaterally.  He has a tiny bit of fluid in his prepatellar bursa and is not red it is not symptomatic    Physical exam of the left knee reveals no effusion, no erythema.  The patient has no palpable tenderness along the medial joint line, no tenderness about the lateral joint line.  Patient does have  crepitus with patellofemoral range of motion.  They also have subjective symptoms anteriorly during exam.  The patient has a negative bounce home, negative Pawel and a stable ligamentous exam.  Quad tone is reasonable and symmetric.  There are no overlying skin changes no lymphedema no lymphadenopathy.  There is good hip range of motion which is full symmetric and asymptomatic and a normal ankle exam.  Hamstrings and IT band are tight bilaterally.   Large Joint Arthrocentesis: R knee  Date/Time: 12/13/2024 8:08 AM  Consent given by: patient  Site marked: site marked  Timeout: Immediately prior to procedure a time out was called to verify the correct patient, procedure, equipment, support staff and site/side marked as required   Supporting Documentation  Indications: pain   Procedure Details  Location: knee - R knee  Preparation: Patient was prepped and draped in the usual sterile fashion  Needle gauge: 21G.  Approach: medial  Medications administered: 80 mg methylPREDNISolone acetate 80 MG/ML; 2 mL lidocaine PF 1% 1 %  Patient tolerance: patient tolerated the procedure well with no immediate complications      Large Joint Arthrocentesis: L knee  Date/Time: 12/13/2024 8:08 AM  Consent given by: patient  Site marked: site marked  Timeout: Immediately prior to procedure a time out was called to verify the correct patient, procedure, equipment, support staff and site/side marked as required   Supporting Documentation  Indications: pain   Procedure Details  Location: knee - L knee  Preparation: Patient was prepped and draped in the usual sterile fashion  Needle gauge: 21G.  Approach: medial  Medications administered: 80 mg methylPREDNISolone acetate 80 MG/ML; 2 mL lidocaine PF 1% 1 %  Patient tolerance: patient tolerated the procedure well with no immediate complications              Previous x-rays show some mild lipping of the lateral femoral condyle also severe patellofemoral OA        Assessment/Plan:         Bilateral knee pain I really think this coming more versus patellofemoral joint a little prepatellar bursitis on the right but I do not think that is part of the issue I think is reasonable to inject these again we talked about staying off his needle at that Maxime Citi fluid resolve

## 2024-12-13 ENCOUNTER — OFFICE VISIT (OUTPATIENT)
Dept: ORTHOPEDIC SURGERY | Facility: CLINIC | Age: 64
End: 2024-12-13
Payer: COMMERCIAL

## 2024-12-13 VITALS — HEIGHT: 69 IN | WEIGHT: 185.6 LBS | TEMPERATURE: 98.2 F | BODY MASS INDEX: 27.49 KG/M2

## 2024-12-13 DIAGNOSIS — M17.10 PATELLOFEMORAL ARTHRITIS: Primary | ICD-10-CM

## 2024-12-13 DIAGNOSIS — M70.41 PREPATELLAR BURSITIS OF RIGHT KNEE: ICD-10-CM

## 2024-12-13 RX ORDER — IBUPROFEN 200 MG
800 TABLET ORAL EVERY 6 HOURS PRN
COMMUNITY

## 2024-12-13 RX ORDER — ACETAMINOPHEN 500 MG
1000 TABLET ORAL EVERY 6 HOURS PRN
COMMUNITY

## 2024-12-13 RX ORDER — LIDOCAINE HYDROCHLORIDE 10 MG/ML
2 INJECTION, SOLUTION EPIDURAL; INFILTRATION; INTRACAUDAL; PERINEURAL
Status: COMPLETED | OUTPATIENT
Start: 2024-12-13 | End: 2024-12-13

## 2024-12-13 RX ORDER — METHYLPREDNISOLONE ACETATE 80 MG/ML
80 INJECTION, SUSPENSION INTRA-ARTICULAR; INTRALESIONAL; INTRAMUSCULAR; SOFT TISSUE
Status: COMPLETED | OUTPATIENT
Start: 2024-12-13 | End: 2024-12-13

## 2024-12-13 RX ADMIN — LIDOCAINE HYDROCHLORIDE 2 ML: 10 INJECTION, SOLUTION EPIDURAL; INFILTRATION; INTRACAUDAL; PERINEURAL at 08:08

## 2024-12-13 RX ADMIN — METHYLPREDNISOLONE ACETATE 80 MG: 80 INJECTION, SUSPENSION INTRA-ARTICULAR; INTRALESIONAL; INTRAMUSCULAR; SOFT TISSUE at 08:08

## 2025-03-05 ENCOUNTER — OFFICE VISIT (OUTPATIENT)
Age: 65
End: 2025-03-05
Payer: COMMERCIAL

## 2025-03-05 VITALS
HEIGHT: 69 IN | SYSTOLIC BLOOD PRESSURE: 131 MMHG | DIASTOLIC BLOOD PRESSURE: 84 MMHG | BODY MASS INDEX: 27.46 KG/M2 | OXYGEN SATURATION: 98 % | TEMPERATURE: 98.7 F | HEART RATE: 49 BPM | WEIGHT: 185.4 LBS

## 2025-03-05 DIAGNOSIS — G89.29 CHRONIC PAIN OF RIGHT KNEE: Primary | ICD-10-CM

## 2025-03-05 DIAGNOSIS — M25.561 CHRONIC PAIN OF RIGHT KNEE: Primary | ICD-10-CM

## 2025-03-05 DIAGNOSIS — M17.11 PRIMARY OSTEOARTHRITIS OF RIGHT KNEE: ICD-10-CM

## 2025-03-05 DIAGNOSIS — M17.11 PATELLOFEMORAL ARTHRITIS OF RIGHT KNEE: ICD-10-CM

## 2025-03-05 RX ADMIN — METHYLPREDNISOLONE ACETATE 80 MG: 80 INJECTION, SUSPENSION INTRA-ARTICULAR; INTRALESIONAL; INTRAMUSCULAR; SOFT TISSUE at 14:17

## 2025-03-05 RX ADMIN — LIDOCAINE HYDROCHLORIDE 2 ML: 10 INJECTION, SOLUTION EPIDURAL; INFILTRATION; INTRACAUDAL; PERINEURAL at 14:17

## 2025-03-07 RX ORDER — METOPROLOL TARTRATE 25 MG/1
12.5 TABLET, FILM COATED ORAL DAILY
Qty: 45 TABLET | Refills: 11 | Status: SHIPPED | OUTPATIENT
Start: 2025-03-07

## 2025-03-11 DIAGNOSIS — S83.241D ACUTE MEDIAL MENISCUS TEAR OF RIGHT KNEE, SUBSEQUENT ENCOUNTER: Primary | ICD-10-CM

## 2025-03-11 NOTE — PROGRESS NOTES
This is a patient that I saw in December for bilateral knee pain he has not patellofemoral arthritis we injected him he got temporary relief over the course of the last 2 to 3 weeks he has had marked worsening in his symptoms right much worse than left and he is miserable.  He did see Dr. Vaughan last week x-rays were similar patellofemoral arthritis she injected him again and he got very little to no relief.  He is miserable I suspect he has a meniscus tear he has done guided home exercise program for quad and core strengthening.  He feels at times that his knee locks at times where he cannot move it and he cannot bear weight.  Plan is to proceed with an MRI.  X-rays done in February of last year also March 5 that show patellofemoral arthritis but normal joint spaces medially and laterally

## 2025-03-18 ENCOUNTER — TELEPHONE (OUTPATIENT)
Dept: ORTHOPEDIC SURGERY | Facility: CLINIC | Age: 65
End: 2025-03-18
Payer: COMMERCIAL

## 2025-03-18 NOTE — TELEPHONE ENCOUNTER
Can you please let him know that he does have a tear of the medial meniscus as well as some arthritis and also has some loose bodies in the knee I suspect it is the meniscus and the loose bodies that are causing his symptoms if he still is symptomatic as he was I would recommend arthroscopy lets get him in Thursday or Friday to discuss

## 2025-03-19 NOTE — PROGRESS NOTES
Shoulder MRI Follow Up      Patient: Mega Mckeon        YOB: 1960            Chief Complaints: Shoulder pain      History of Present Illness: The patient is here follow-up of an MRI of the shoulder      Physical Exam: 65 y.o. male  General Appearance:    Alert, cooperative, in no acute distress                 There were no vitals filed for this visit.     Patient is alert and read ×3 no acute distress appears her above-listed at height weight and age.  Affect is normal respiratory rate is normal unlabored. Heart rate regular rate rhythm, sclera, dentition and hearing are normal for the purpose of this exam.      Ortho Exam      MRI Results:  Procedures      Assessment/Plan:

## 2025-03-20 ENCOUNTER — TELEPHONE (OUTPATIENT)
Dept: ORTHOPEDIC SURGERY | Facility: CLINIC | Age: 65
End: 2025-03-20
Payer: COMMERCIAL

## 2025-03-20 ENCOUNTER — OFFICE VISIT (OUTPATIENT)
Dept: ORTHOPEDIC SURGERY | Facility: CLINIC | Age: 65
End: 2025-03-20
Payer: COMMERCIAL

## 2025-03-20 ENCOUNTER — TELEPHONE (OUTPATIENT)
Dept: CARDIOLOGY | Facility: CLINIC | Age: 65
End: 2025-03-20
Payer: COMMERCIAL

## 2025-03-20 VITALS — HEIGHT: 69 IN | WEIGHT: 180.6 LBS | BODY MASS INDEX: 26.75 KG/M2 | TEMPERATURE: 98.3 F

## 2025-03-20 DIAGNOSIS — S83.241D OTHER TEAR OF MEDIAL MENISCUS OF RIGHT KNEE AS CURRENT INJURY, SUBSEQUENT ENCOUNTER: Primary | ICD-10-CM

## 2025-03-20 DIAGNOSIS — M23.41 LOOSE BODY OF RIGHT KNEE: ICD-10-CM

## 2025-03-20 PROBLEM — S83.241A TEAR OF MEDIAL MENISCUS OF RIGHT KNEE, CURRENT: Status: ACTIVE | Noted: 2025-03-20

## 2025-03-20 RX ORDER — IBUPROFEN 800 MG/1
800 TABLET, FILM COATED ORAL 3 TIMES DAILY
Qty: 90 TABLET | Refills: 0 | Status: SHIPPED | OUTPATIENT
Start: 2025-03-20

## 2025-03-20 NOTE — TELEPHONE ENCOUNTER
Received cardiac clearance request from Leslee Garcia MD.  Patient scheduled to have Knee Arthroscopy.  Placing form in your inbox./ MONI

## 2025-03-20 NOTE — TELEPHONE ENCOUNTER
Faxed completed cardiac clearance form to Dr. Leslee Garcia.  Fax# [phone].  Faxed confirmation received./ MONI

## 2025-03-20 NOTE — PROGRESS NOTES
Patient: Mega Mckeon  YOB: 1960  Date of Service: 3/20/2025    Chief Complaints: Right knee pain    Subjective:    History of Present Illness: Pt is seen in the office today with complaints of right knee pain he is here follow-up of MRI.  MRI demonstrates some arthritis most pronounced at the patellofemoral joint he has several loose bodies in the knee he has a radial tear of the central body of the lateral meniscus and also a tear of the posterior horn medial meniscus he has distinct locking and catching that I suspect are more from the loose bodies and then persistent pain that is probably from the meniscus.  He is miserable especially with mechanical symptoms plan is to proceed with arthroscopy        Allergies: No Known Allergies    Medications:   Home Medications:  Current Outpatient Medications on File Prior to Visit   Medication Sig    acetaminophen (TYLENOL) 500 MG tablet Take 2 tablets by mouth Every 6 (Six) Hours As Needed for Mild Pain.    aspirin 81 MG tablet Take 1 tablet by mouth Daily.    atorvastatin (LIPITOR) 80 MG tablet Take 1 tablet by mouth Daily.    ibuprofen (ADVIL,MOTRIN) 200 MG tablet Take 4 tablets by mouth Every 6 (Six) Hours As Needed for Mild Pain.    metoprolol tartrate (LOPRESSOR) 25 MG tablet TAKE 1/2 TABLET BY MOUTH DAILY     No current facility-administered medications on file prior to visit.     Current Medications:  Scheduled Meds:  Continuous Infusions:No current facility-administered medications for this visit.    PRN Meds:.    I have reviewed the patient's medical history in detail and updated the computerized patient record.  Review and summarization of old records include:    Past Medical History:   Diagnosis Date    Arthritis     Atypical chest pain 11/04/2018    CAD (coronary artery disease)     Heart disease     History of kidney stones     History of measles     History of mumps     Hyperlipidemia     Ischemic cardiomyopathy     Kidney stone      Knee swelling 2017    Myocardial infarction     status: post drug eluting stent to the right coronary artery    Tear of medial meniscus of right knee, current 3/20/2025    Tendinitis of knee 2017    Whooping cough         Past Surgical History:   Procedure Laterality Date    COLONOSCOPY  05/08/2015    Dr Stevens     CORONARY ANGIOPLASTY WITH STENT PLACEMENT  02/11/2011    [facility]    VASECTOMY  1994        Social History     Occupational History    Not on file   Tobacco Use    Smoking status: Never     Passive exposure: Never (parents smoked in the home)    Smokeless tobacco: Never    Tobacco comments:     caffeine use   Vaping Use    Vaping status: Never Used   Substance and Sexual Activity    Alcohol use: Yes     Alcohol/week: 2.0 standard drinks of alcohol     Types: 2 Cans of beer per week     Comment: 2-3 beers weekly    Drug use: No    Sexual activity: Yes     Partners: Female     Birth control/protection: Post-menopausal, Vasectomy      Social History     Social History Narrative    Self employed - works in concrete         Family History   Problem Relation Age of Onset    Diabetes Father     Diabetes Mother        ROS: 14 point review of systems was performed and was negative except for documented findings in HPI and today's encounter.     Allergies: No Known Allergies  Constitutional:  Denies fever, shaking or chills   Eyes:  Denies change in visual acuity   HENT:  Denies nasal congestion or sore throat   Respiratory:  Denies cough or shortness of breath   Cardiovascular:  Denies chest pain or severe LE edema   GI:  Denies abdominal pain, nausea, vomiting, bloody stools or diarrhea   Musculoskeletal:  Numbness, tingling, or loss of motor function only as noted above in history of present illness.  : Denies painful urination or hematuria  Integument:  Denies rash, lesion or ulceration   Neurologic:  Denies headache or focal weakness  Endocrine:  Denies lymphadenopathy  Psych:  Denies confusion or change in mental  "status   Hem:  Denies active bleeding      Physical Exam: 65 y.o. male  Wt Readings from Last 3 Encounters:   03/20/25 81.9 kg (180 lb 9.6 oz)   03/05/25 84.1 kg (185 lb 6.4 oz)   12/13/24 84.2 kg (185 lb 9.6 oz)       Body mass index is 26.67 kg/m².    Vitals:    03/20/25 0806   Temp: 98.3 °F (36.8 °C)     Vital signs reviewed.   General Appearance:    Alert, cooperative, in no acute distress                    Ortho exam      Physical exam of the right  knee reveals no effusion no redness.  The patient does have tenderness about the medial l joint line.  No tenderness about the lateral l joint line.  A negative bounce home and a positive l medial Pawel.    Patient has a stable ligamentous exam.  The patient has a negative Lachman and negative anterior drawer and a negative pivot shift.  Quads are reasonable and symmetric bilaterally.  Calf is soft and nontender.  There is no overlying skin changes no lymphedema lymphadenopathy.  Patient has good hip range of motion full symmetric and asymptomatic and a normal ankle exam.  She has good distal pulses and sensation distally is intact       Physical Exam: 65 y.o. male  General Appearance:    Alert, cooperative, in no acute distress                      Vitals:    03/20/25 0806   Temp: 98.3 °F (36.8 °C)   TempSrc: Temporal   Weight: 81.9 kg (180 lb 9.6 oz)   Height: 175.3 cm (69\")   PainSc: 6    PainLoc: Knee        Head:    Normocephalic, without obvious abnormality, atraumatic   Eyes:            conjunctivae and sclerae normal, no pallor, corneas clear,    Ears:    Ears appear intact with no abnormalities noted   Throat:   No oral lesions, no thrush, oral mucosa moist   Neck:   No adenopathy, supple, trachea midline, no thyromegaly,    Back:     No kyphosis present, no scoliosis present, no skin lesions,      erythema or scars, no tenderness to percussion or                   palpation,   range of motion normal   Lungs:     ,respirations regular, even and          "         unlabored    Heart:    Regular rhythm and normal rate               Chest Wall:    No abnormalities observed               Pulses:   Pulses palpable and equal bilaterally   Skin:   No bleeding, bruising or rash   Lymph nodes:   No palpable adenopathy   Neurologic:   Appears neurologic intact         MRIs as above that is my interpretation I have reviewed myself      Assessment: Right knee medial lateral meniscus tear loose bodiesright knee pain he is here follow-up of MRI.  MRI demonstrates some arthritis most pronounced at the patellofemoral joint he has several loose bodies in the knee he has a radial tear of the central body of the lateral meniscus and also a tear of the posterior horn medial meniscus he has distinct locking and catching that I suspect are more from the loose bodies and then persistent pain that is probably from the meniscus.  He is miserable especially with mechanical symptoms plan is to proceed with arthroscopy    Plan: Plan is to proceed with arthroscopy we did discuss in detail risk benefits and alternatives  The patient voiced understanding of the risks, benefits, and alternative forms of treatment that were discussed and the patient consents to proceed with the above listed surgery.  All risks, benefits and alternatives were discussed.  Risks including to but not exclusive to anesthetic complications, including death, MI, CVA, infection, bleeding DVT, fracture, residual pain and need for future surgery.  He understands and agrees to proceed  Follow up as indicated.  Ice, elevate, and rest as needed.  Discussed conservative measures of pain control including ice, bracing.   Leslee Garcia M.D.

## 2025-03-26 ENCOUNTER — PATIENT MESSAGE (OUTPATIENT)
Dept: CARDIOLOGY | Age: 65
End: 2025-03-26
Payer: COMMERCIAL

## 2025-03-30 RX ORDER — METHYLPREDNISOLONE ACETATE 80 MG/ML
80 INJECTION, SUSPENSION INTRA-ARTICULAR; INTRALESIONAL; INTRAMUSCULAR; SOFT TISSUE
Status: COMPLETED | OUTPATIENT
Start: 2025-03-05 | End: 2025-03-05

## 2025-03-30 RX ORDER — LIDOCAINE HYDROCHLORIDE 10 MG/ML
2 INJECTION, SOLUTION EPIDURAL; INFILTRATION; INTRACAUDAL; PERINEURAL
Status: COMPLETED | OUTPATIENT
Start: 2025-03-05 | End: 2025-03-05

## 2025-03-31 ENCOUNTER — PRE-ADMISSION TESTING (OUTPATIENT)
Dept: PREADMISSION TESTING | Facility: HOSPITAL | Age: 65
End: 2025-03-31
Payer: COMMERCIAL

## 2025-03-31 VITALS
OXYGEN SATURATION: 98 % | HEART RATE: 61 BPM | DIASTOLIC BLOOD PRESSURE: 73 MMHG | WEIGHT: 180 LBS | RESPIRATION RATE: 16 BRPM | TEMPERATURE: 97.9 F | HEIGHT: 67 IN | SYSTOLIC BLOOD PRESSURE: 131 MMHG | BODY MASS INDEX: 28.25 KG/M2

## 2025-03-31 LAB
ANION GAP SERPL CALCULATED.3IONS-SCNC: 8.8 MMOL/L (ref 5–15)
BUN SERPL-MCNC: 15 MG/DL (ref 8–23)
BUN/CREAT SERPL: 17 (ref 7–25)
CALCIUM SPEC-SCNC: 8.9 MG/DL (ref 8.6–10.5)
CHLORIDE SERPL-SCNC: 108 MMOL/L (ref 98–107)
CO2 SERPL-SCNC: 23.2 MMOL/L (ref 22–29)
CREAT SERPL-MCNC: 0.88 MG/DL (ref 0.76–1.27)
DEPRECATED RDW RBC AUTO: 46.2 FL (ref 37–54)
EGFRCR SERPLBLD CKD-EPI 2021: 95.4 ML/MIN/1.73
ERYTHROCYTE [DISTWIDTH] IN BLOOD BY AUTOMATED COUNT: 12.8 % (ref 12.3–15.4)
GLUCOSE SERPL-MCNC: 89 MG/DL (ref 65–99)
HCT VFR BLD AUTO: 39.8 % (ref 37.5–51)
HGB BLD-MCNC: 13.1 G/DL (ref 13–17.7)
MCH RBC QN AUTO: 32 PG (ref 26.6–33)
MCHC RBC AUTO-ENTMCNC: 32.9 G/DL (ref 31.5–35.7)
MCV RBC AUTO: 97.1 FL (ref 79–97)
PLATELET # BLD AUTO: 226 10*3/MM3 (ref 140–450)
PMV BLD AUTO: 10.3 FL (ref 6–12)
POTASSIUM SERPL-SCNC: 4 MMOL/L (ref 3.5–5.2)
RBC # BLD AUTO: 4.1 10*6/MM3 (ref 4.14–5.8)
SODIUM SERPL-SCNC: 140 MMOL/L (ref 136–145)
WBC NRBC COR # BLD AUTO: 7.4 10*3/MM3 (ref 3.4–10.8)

## 2025-03-31 PROCEDURE — 80048 BASIC METABOLIC PNL TOTAL CA: CPT

## 2025-03-31 PROCEDURE — 36415 COLL VENOUS BLD VENIPUNCTURE: CPT

## 2025-03-31 PROCEDURE — 85027 COMPLETE CBC AUTOMATED: CPT

## 2025-03-31 NOTE — DISCHARGE INSTRUCTIONS
Take the following medications the morning of surgery:    METOPROLOL    If you are on prescription narcotic pain medication to control your pain you may also take that medication the morning of surgery.      General Instructions:     Do not eat solid food after midnight the night before surgery.  Clear liquids day of surgery are allowed but must be stopped at least two hours before your hospital arrival time.       Allowed clear liquids      Water, sodas, and tea or coffee with no cream or milk added.       12 to 20 ounces of a clear liquid that contains carbohydrates is recommended.  If non-diabetic, have Gatorade or Powerade.  If diabetic, have G2 or Powerade Zero.     Do not have liquids red in color.  Do not consume chicken, beef, pork or vegetable broth or bouillon cubes of any variety as they are not considered clear liquids and are not allowed.      Infants may have breast milk up to four hours before surgery.  Infants drinking formula may drink formula up to six hours before surgery.   Patients who avoid smoking, chewing tobacco and alcohol for 4 weeks prior to surgery have a reduced risk of post-operative complications.  Quit smoking as many days before surgery as you can.  Do not smoke, use chewing tobacco or drink alcohol the day of surgery.   If applicable bring your C-PAP/ BI-PAP machine in with you to preop day of surgery.  Bring any papers given to you in the doctor’s office.  Wear clean comfortable clothes.  Do not wear contact lenses, false eyelashes or make-up.  Bring a case for your glasses.   Bring crutches or walker if applicable.  Remove all piercings.  Leave jewelry and any other valuables at home.  Hair extensions with metal clips must be removed prior to surgery.  The Pre-Admission Testing nurse will instruct you to bring medications if unable to obtain an accurate list in Pre-Admission Testing.    Day of surgery you will need to let the preoperative nurse know the last time you took each of  your medications.  To ensure a safe environment for patients and staff, we kindly ask that children under the age of 16 not accompany patients.  If you must bring a dependent child or dependent adult please ensure a responsible adult, other than yourself, is present to supervise them.      If you were given a blood bank ID arm band remember to bring it with you the day of surgery.    Preventing a Surgical Site Infection:  For 2 to 3 days before surgery, avoid shaving with a razor because the razor can irritate skin and make it easier to develop an infection.    Any areas of open skin can increase the risk of a post-operative wound infection by allowing bacteria to enter and travel throughout the body.  Notify your surgeon if you have any skin wounds / rashes even if it is not near the expected surgical site.  The area will need assessed to determine if surgery should be delayed until it is healed.  The night prior to surgery shower using a fresh bar of anti-bacterial soap (such as Dial) and clean washcloth.  Sleep in a clean bed with clean clothing.  Do not allow pets to sleep with you.  Shower on the morning of surgery using a fresh bar of anti-bacterial soap (such as Dial) and clean washcloth.  Dry with a clean towel and dress in clean clothing.  Ask your surgeon if you will be receiving antibiotics prior to surgery.  Make sure you, your family, and all healthcare providers clean their hands with soap and water or an alcohol based hand  before caring for you or your wound.    Day of surgery:  Your arrival time is approximately two hours before your scheduled surgery time.  Please note if you have an early arrival time the surgery doors do not open before 5:00 AM.  Upon arrival, a Pre-op nurse and Anesthesiologist will review your health history, obtain vital signs, and answer questions you may have.  The only belongings needed at this time will be a list of your home medications and if applicable your  C-PAP/BI-PAP machine.  A Pre-op nurse will start an IV and you may receive medication in preparation for surgery, including something to help you relax.     Please be aware that surgery does come with discomfort.  We want to make every effort to control your discomfort so please discuss any uncontrolled symptoms with your nurse.   Your doctor will most likely have prescribed pain medications.      If you are going home after surgery you will receive individualized written care instructions before being discharged.  A responsible adult must drive you to and from the hospital on the day of your surgery and ideally stay with you through the night.   .  Discharge prescriptions can be filled by the hospital pharmacy during regular pharmacy hours.  If you are having surgery late in the day/evening your prescription may be e-prescribed to your pharmacy.  Please verify your pharmacy hours or chose a 24 hour pharmacy to avoid not having access to your prescription because your pharmacy has closed for the day.    If you are staying overnight following surgery, you will be transported to your hospital room following the recovery period.  [facility] has all private rooms.    If you have any questions please call Pre-Admission Testing at [phone].  Deductibles and co-payments are collected on the day of service. Please be prepared to pay the required co-pay, deductible or deposit on the day of service as defined by your plan.    Call your surgeon immediately if you experience any of the following symptoms:  Sore Throat  Shortness of Breath or difficulty breathing  Cough  Chills  Body soreness or muscle pain  Headache  Fever  New loss of taste or smell  Do not arrive for your surgery ill.  Your procedure will need to be rescheduled to another time.  You will need to call your physician before the day of surgery to avoid any unnecessary exposure to hospital staff as well as other patients.

## 2025-03-31 NOTE — PROGRESS NOTES
Chief Complaint   Patient presents with    Right Knee - Initial Evaluation       History of Present Illness  Mega is a 65 y.o. year old male here today for right knee pain that has been present for years with acute worsening since November 2024 with no known injury or trauma. Was initially seen by Dr. Garcia and underwent bilateral knee injections on 12/13/24, but continues to have variable right knee pain.   History of Present Illness  The patient presents for evaluation of right knee pain.    He received bilateral knee injections in December, with the left knee remaining asymptomatic. However, he reports a sudden onset of pain in his right knee, characterized by intermittent burning sensations and pain that waxes and wanes. He notes mild swelling. He describes the pain as a dull ache, at times severe enough to induce nausea. The pain is exacerbated by driving, particularly when he applies pressure to the knee or rests it in a sideways position. He likens the sensation to a twisting injury, although he does not recall any specific incident. The pain recurred approximately 2 weeks ago. The pain is exacerbated by driving, particularly when he applies pressure to the knee or rests it in a sideways position. He has been managing the pain with ibuprofen and meloxicam, the latter of which he took yesterday and today, reporting some relief. He has a long-standing history of knee issues, dating back to 2017, and has received 3 injections to date, each providing significant relief. His occupation involves extensive kneeling and crawling, which he believes may contribute to his symptoms.    MEDICATIONS  Current: Ibuprofen, meloxicam         The following data was reviewed by: Eden Vaughan DO on 03/05/2025:  Data reviewed : Ortho note from 12/13/24      Lab Results   Component Value Date    GLUCOSE 109 (H) 06/12/2024    BUN 18 06/12/2024    CREATININE 0.82 06/12/2024     06/12/2024    K 4.4 06/12/2024      "06/12/2024    CALCIUM 9.4 06/12/2024    PROTEINTOT 6.4 06/12/2024    ALBUMIN 4.5 06/12/2024    ALT 31 06/12/2024    AST 35 06/12/2024    ALKPHOS 121 (H) 06/12/2024    BILITOT 0.7 06/12/2024    GLOB 1.9 06/12/2024    AGRATIO 2.4 06/12/2024    BCR 22.0 06/12/2024    ANIONGAP 17.3 08/24/2016    EGFR 98.1 06/12/2024         /84 (BP Location: Left arm)   Pulse (!) 49   Temp 98.7 °F (37.1 °C)   Ht 175.3 cm (69\")   Wt 84.1 kg (185 lb 6.4 oz)   SpO2 98%   BMI 27.38 kg/m²        Physical Exam  Vital signs reviewed.   General: Well developed, well nourished; No acute distress.  Eyes: conjunctiva clear; pupils equally round and reactive  ENT: external ears and nose atraumatic; hearing normal  Resp: normal respiratory effort, no use of accessory muscles  Skin: normal color and pigmentation; no rashes or wounds; normal turgor  Psych: alert and oriented; mood and affect appropriate; recent and remote memory intact    MSK Exam:  The right knee is without obvious signs of acute bony deformity, swelling, erythema, ecchymosis or joint effusion. The patellar facets are tender. Patella crepitus is positive. The medial joint line is tender. No bony crepitus or step-off. Flexion & extension are full and symmetrical. Knee and hip strength is 5/5 and symmetrical, with mild pain with resisted extension. Pain with Pawel's. Varus & valgus stress, Lachman's, anterior drawer, and posterior drawer are all negative.        Right Knee X-Ray  Indication: Pain  Views: AP, Lateral, and Milmay  Findings:  No fracture  No bony lesion  Normal soft tissues  Mild to moderate medial compartment narrowing  Moderated patellofemoral narrowing and spurring, worse on the lateral aspect      - Large Joint Arthrocentesis: R knee on 3/5/2025 2:17 PM  Indications: pain  Details: 22 G needle, ultrasound-guided superolateral approach  Medications: 2 mL lidocaine PF 1% 1 %; 80 mg methylPREDNISolone acetate 80 MG/ML  Outcome: tolerated well, no " immediate complications  Procedure, treatment alternatives, risks and benefits explained, specific risks discussed. Consent was given by the patient. Immediately prior to procedure a time out was called to verify the correct patient, procedure, equipment, support staff and site/side marked as required. Patient was prepped and draped in the usual sterile fashion.         Assessment and Plan  Diagnoses and all orders for this visit:    1. Chronic pain of right knee (Primary)  -     Cancel: XR Knee 3 View Right    2. Primary osteoarthritis of right knee    3. Patellofemoral arthritis of right knee    Mega is a 65 y.o. year old male here today for right knee pain that has been present for years with acute worsening since November 2024 with no known injury or trauma. Was initially seen by Dr. Garcia and underwent bilateral knee injections on 12/13/24, but continues to have variable right knee pain.    Assessment & Plan  1. Right knee pain.  The patient's symptoms are consistent with arthritis, as previously diagnosed by Dr. Garcia. He reports that the pain has returned approximately 2 weeks ago, despite receiving a steroid injection on 12/13/2024. HE continues to have symptoms of arthritis. Last injection was slightly less than 3 months ago. The potential risks associated with frequent steroid injections, including the possibility of exacerbating cartilage damage, have been discussed. An alternative treatment option involving gel injections or hyaluronic acid viscosupplementation was also discussed. These injections could potentially provide longer-lasting relief but require prior authorization from insurance. He prefers to proceed with another steroid injection at this time. He may consider the gel injections in the future.    Decision was made to proceed with repeat steroid injection. The procedure was well tolerated. He has been educated on the signs and symptoms of local reaction and infection and should call the  office if he experiences any of these. He should take it easy for the next 24 to 48 hours. After that, he may return to normal activity. It was recommended to continue low impact activity. He may also ice and take oral anti-inflammatory medications or Tylenol (unless otherwise instructed) as needed for pain. He understands that this is not a cure, but an attempt to decrease his discomfort.     We will follow-up as needed. All of his questions were answered and he is agreeable with the plan.    Dictated utilizing Dragon dictation.  Patient or patient representative verbalized consent for the use of Ambient Listening during the visit with  Eden Vaughan DO for chart documentation. 3/5/2025  11:46 EDT

## 2025-04-01 NOTE — PROGRESS NOTES
Knee Scope follow Up 1st Visit      Patient: Mega Mckeon        YOB: 1960      Chief Complaints: knee pain right      History of Present Illness: Pt is here f/u knee arthroscopy on the right knee he is doing great states his knee feels better still sore sounds like he is doing too much she does admit to doing that        Allergies: No Known Allergies    Medications:   Home Medications:  Current Outpatient Medications on File Prior to Visit   Medication Sig    acetaminophen (TYLENOL) 500 MG tablet Take 2 tablets by mouth Every 6 (Six) Hours As Needed for Mild Pain.    aspirin 81 MG tablet Take 1 tablet by mouth Daily. (Patient taking differently: Take 1 tablet by mouth Daily. PT HOLDING FOR 5 DAYS BEFORE SURGERY PER MD)    atorvastatin (LIPITOR) 80 MG tablet Take 1 tablet by mouth Daily.    ibuprofen (ADVIL,MOTRIN) 800 MG tablet Take 1 tablet by mouth 3 (Three) Times a Day. (Patient taking differently: Take 1 tablet by mouth 3 (Three) Times a Day. HOLD PRIOR TO SURG)    metoprolol tartrate (LOPRESSOR) 25 MG tablet TAKE 1/2 TABLET BY MOUTH DAILY     No current facility-administered medications on file prior to visit.     Current Medications:  Scheduled Meds:  Continuous Infusions:No current facility-administered medications for this visit.    PRN Meds:.          Physical Exam: 65 y.o. male  General Appearance:    Alert, cooperative, in no acute distress                 There were no vitals filed for this visit.   Patient is alert and oriented ×3 no acute distress normal mood physical exam.  Physical exam of the knee, incisions looked good there is no erythema, calf is soft and non-tender.  No sign or sx of DVT      Assessment  S/P knee scope.  I did review intraoperative findings and arthroscopic pictures with the patient.          Plan: To remove sutures today place Steri-Strips and start into  physical therapy and I will have thrm follow up in 4 weeks.  He states he is going to do the therapy at  home on his own he knows what to do he has appointment for 4 weeks if he is doing well he may call and cancel

## 2025-04-02 ENCOUNTER — ANESTHESIA (OUTPATIENT)
Dept: PERIOP | Facility: HOSPITAL | Age: 65
End: 2025-04-02
Payer: COMMERCIAL

## 2025-04-02 ENCOUNTER — HOSPITAL ENCOUNTER (OUTPATIENT)
Facility: HOSPITAL | Age: 65
Setting detail: HOSPITAL OUTPATIENT SURGERY
Discharge: HOME | End: 2025-04-02
Attending: ORTHOPAEDIC SURGERY | Admitting: ORTHOPAEDIC SURGERY
Payer: COMMERCIAL

## 2025-04-02 ENCOUNTER — ANESTHESIA EVENT (OUTPATIENT)
Dept: PERIOP | Facility: HOSPITAL | Age: 65
End: 2025-04-02
Payer: COMMERCIAL

## 2025-04-02 VITALS
HEART RATE: 85 BPM | TEMPERATURE: 97.8 F | DIASTOLIC BLOOD PRESSURE: 105 MMHG | OXYGEN SATURATION: 96 % | RESPIRATION RATE: 16 BRPM | SYSTOLIC BLOOD PRESSURE: 148 MMHG

## 2025-04-02 DIAGNOSIS — S83.241D OTHER TEAR OF MEDIAL MENISCUS OF RIGHT KNEE AS CURRENT INJURY, SUBSEQUENT ENCOUNTER: ICD-10-CM

## 2025-04-02 DIAGNOSIS — M23.41 LOOSE BODY OF RIGHT KNEE: ICD-10-CM

## 2025-04-02 PROCEDURE — 25010000002 ONDANSETRON PER 1 MG: Performed by: NURSE ANESTHETIST, CERTIFIED REGISTERED

## 2025-04-02 PROCEDURE — 25010000002 MIDAZOLAM PER 1 MG: Performed by: ANESTHESIOLOGY

## 2025-04-02 PROCEDURE — 25010000002 CEFAZOLIN PER 500 MG: Performed by: ORTHOPAEDIC SURGERY

## 2025-04-02 PROCEDURE — 25010000002 HYDROMORPHONE 1 MG/ML SOLUTION: Performed by: NURSE ANESTHETIST, CERTIFIED REGISTERED

## 2025-04-02 PROCEDURE — 25010000002 METHYLPREDNISOLONE PER 80 MG: Performed by: ORTHOPAEDIC SURGERY

## 2025-04-02 PROCEDURE — 25010000002 EPINEPHRINE PER 0.1 MG: Performed by: ORTHOPAEDIC SURGERY

## 2025-04-02 PROCEDURE — 25010000002 FENTANYL CITRATE (PF) 50 MCG/ML SOLUTION: Performed by: NURSE ANESTHETIST, CERTIFIED REGISTERED

## 2025-04-02 PROCEDURE — 25010000002 LIDOCAINE 2% SOLUTION: Performed by: NURSE ANESTHETIST, CERTIFIED REGISTERED

## 2025-04-02 PROCEDURE — 25810000003 LACTATED RINGERS PER 1000 ML: Performed by: ORTHOPAEDIC SURGERY

## 2025-04-02 PROCEDURE — 29881 ARTHRS KNE SRG MNISECTMY M/L: CPT | Performed by: ORTHOPAEDIC SURGERY

## 2025-04-02 PROCEDURE — 25010000002 PROPOFOL 10 MG/ML EMULSION: Performed by: NURSE ANESTHETIST, CERTIFIED REGISTERED

## 2025-04-02 PROCEDURE — 25010000002 KETOROLAC TROMETHAMINE PER 15 MG: Performed by: NURSE ANESTHETIST, CERTIFIED REGISTERED

## 2025-04-02 PROCEDURE — 25810000003 LACTATED RINGERS PER 1000 ML: Performed by: ANESTHESIOLOGY

## 2025-04-02 PROCEDURE — 29881 ARTHRS KNE SRG MNISECTMY M/L: CPT | Performed by: SPECIALIST/TECHNOLOGIST, OTHER

## 2025-04-02 PROCEDURE — 25010000002 DEXAMETHASONE PER 1 MG: Performed by: NURSE ANESTHETIST, CERTIFIED REGISTERED

## 2025-04-02 RX ORDER — ATROPINE SULFATE 0.4 MG/ML
0.4 INJECTION, SOLUTION INTRAMUSCULAR; INTRAVENOUS; SUBCUTANEOUS ONCE AS NEEDED
Status: DISCONTINUED | OUTPATIENT
Start: 2025-04-02 | End: 2025-04-02 | Stop reason: HOSPADM

## 2025-04-02 RX ORDER — ONDANSETRON 2 MG/ML
4 INJECTION INTRAMUSCULAR; INTRAVENOUS ONCE AS NEEDED
Status: DISCONTINUED | OUTPATIENT
Start: 2025-04-02 | End: 2025-04-02 | Stop reason: HOSPADM

## 2025-04-02 RX ORDER — HYDRALAZINE HYDROCHLORIDE 20 MG/ML
5 INJECTION INTRAMUSCULAR; INTRAVENOUS
Status: DISCONTINUED | OUTPATIENT
Start: 2025-04-02 | End: 2025-04-02 | Stop reason: HOSPADM

## 2025-04-02 RX ORDER — PROMETHAZINE HYDROCHLORIDE 25 MG/1
25 SUPPOSITORY RECTAL ONCE AS NEEDED
Status: DISCONTINUED | OUTPATIENT
Start: 2025-04-02 | End: 2025-04-02 | Stop reason: HOSPADM

## 2025-04-02 RX ORDER — FENTANYL CITRATE 50 UG/ML
50 INJECTION, SOLUTION INTRAMUSCULAR; INTRAVENOUS
Status: DISCONTINUED | OUTPATIENT
Start: 2025-04-02 | End: 2025-04-02 | Stop reason: HOSPADM

## 2025-04-02 RX ORDER — SODIUM CHLORIDE 0.9 % (FLUSH) 0.9 %
10 SYRINGE (ML) INJECTION AS NEEDED
Status: DISCONTINUED | OUTPATIENT
Start: 2025-04-02 | End: 2025-04-02 | Stop reason: HOSPADM

## 2025-04-02 RX ORDER — MIDAZOLAM HYDROCHLORIDE 1 MG/ML
1 INJECTION, SOLUTION INTRAMUSCULAR; INTRAVENOUS
Status: DISCONTINUED | OUTPATIENT
Start: 2025-04-02 | End: 2025-04-02 | Stop reason: HOSPADM

## 2025-04-02 RX ORDER — PROMETHAZINE HYDROCHLORIDE 25 MG/1
25 TABLET ORAL ONCE AS NEEDED
Status: DISCONTINUED | OUTPATIENT
Start: 2025-04-02 | End: 2025-04-02 | Stop reason: HOSPADM

## 2025-04-02 RX ORDER — ONDANSETRON 2 MG/ML
INJECTION INTRAMUSCULAR; INTRAVENOUS AS NEEDED
Status: DISCONTINUED | OUTPATIENT
Start: 2025-04-02 | End: 2025-04-02 | Stop reason: SURG

## 2025-04-02 RX ORDER — SODIUM CHLORIDE 9 MG/ML
40 INJECTION, SOLUTION INTRAVENOUS AS NEEDED
Status: DISCONTINUED | OUTPATIENT
Start: 2025-04-02 | End: 2025-04-02 | Stop reason: HOSPADM

## 2025-04-02 RX ORDER — DROPERIDOL 2.5 MG/ML
0.62 INJECTION, SOLUTION INTRAMUSCULAR; INTRAVENOUS
Status: DISCONTINUED | OUTPATIENT
Start: 2025-04-02 | End: 2025-04-02 | Stop reason: HOSPADM

## 2025-04-02 RX ORDER — FENTANYL CITRATE 50 UG/ML
INJECTION, SOLUTION INTRAMUSCULAR; INTRAVENOUS AS NEEDED
Status: DISCONTINUED | OUTPATIENT
Start: 2025-04-02 | End: 2025-04-02 | Stop reason: SURG

## 2025-04-02 RX ORDER — FLUMAZENIL 0.1 MG/ML
0.2 INJECTION INTRAVENOUS AS NEEDED
Status: DISCONTINUED | OUTPATIENT
Start: 2025-04-02 | End: 2025-04-02 | Stop reason: HOSPADM

## 2025-04-02 RX ORDER — HYDROCODONE BITARTRATE AND ACETAMINOPHEN 5; 325 MG/1; MG/1
1 TABLET ORAL ONCE AS NEEDED
Status: COMPLETED | OUTPATIENT
Start: 2025-04-02 | End: 2025-04-02

## 2025-04-02 RX ORDER — DEXAMETHASONE SODIUM PHOSPHATE 4 MG/ML
INJECTION, SOLUTION INTRA-ARTICULAR; INTRALESIONAL; INTRAMUSCULAR; INTRAVENOUS; SOFT TISSUE AS NEEDED
Status: DISCONTINUED | OUTPATIENT
Start: 2025-04-02 | End: 2025-04-02 | Stop reason: SURG

## 2025-04-02 RX ORDER — SODIUM CHLORIDE, SODIUM LACTATE, POTASSIUM CHLORIDE, CALCIUM CHLORIDE 600; 310; 30; 20 MG/100ML; MG/100ML; MG/100ML; MG/100ML
9 INJECTION, SOLUTION INTRAVENOUS CONTINUOUS PRN
Status: DISCONTINUED | OUTPATIENT
Start: 2025-04-02 | End: 2025-04-02 | Stop reason: HOSPADM

## 2025-04-02 RX ORDER — IPRATROPIUM BROMIDE AND ALBUTEROL SULFATE 2.5; .5 MG/3ML; MG/3ML
3 SOLUTION RESPIRATORY (INHALATION) ONCE AS NEEDED
Status: DISCONTINUED | OUTPATIENT
Start: 2025-04-02 | End: 2025-04-02 | Stop reason: HOSPADM

## 2025-04-02 RX ORDER — LABETALOL HYDROCHLORIDE 5 MG/ML
5 INJECTION, SOLUTION INTRAVENOUS
Status: DISCONTINUED | OUTPATIENT
Start: 2025-04-02 | End: 2025-04-02 | Stop reason: HOSPADM

## 2025-04-02 RX ORDER — SODIUM CHLORIDE 0.9 % (FLUSH) 0.9 %
10 SYRINGE (ML) INJECTION EVERY 12 HOURS SCHEDULED
Status: DISCONTINUED | OUTPATIENT
Start: 2025-04-02 | End: 2025-04-02 | Stop reason: HOSPADM

## 2025-04-02 RX ORDER — EPHEDRINE SULFATE 50 MG/ML
5 INJECTION, SOLUTION INTRAVENOUS ONCE AS NEEDED
Status: DISCONTINUED | OUTPATIENT
Start: 2025-04-02 | End: 2025-04-02 | Stop reason: HOSPADM

## 2025-04-02 RX ORDER — NALOXONE HCL 0.4 MG/ML
0.2 VIAL (ML) INJECTION AS NEEDED
Status: DISCONTINUED | OUTPATIENT
Start: 2025-04-02 | End: 2025-04-02 | Stop reason: HOSPADM

## 2025-04-02 RX ORDER — LIDOCAINE HYDROCHLORIDE 20 MG/ML
INJECTION, SOLUTION INFILTRATION; PERINEURAL AS NEEDED
Status: DISCONTINUED | OUTPATIENT
Start: 2025-04-02 | End: 2025-04-02 | Stop reason: SURG

## 2025-04-02 RX ORDER — OXYCODONE AND ACETAMINOPHEN 7.5; 325 MG/1; MG/1
1 TABLET ORAL EVERY 4 HOURS PRN
Status: DISCONTINUED | OUTPATIENT
Start: 2025-04-02 | End: 2025-04-02 | Stop reason: HOSPADM

## 2025-04-02 RX ORDER — DIPHENHYDRAMINE HYDROCHLORIDE 50 MG/ML
12.5 INJECTION, SOLUTION INTRAMUSCULAR; INTRAVENOUS
Status: DISCONTINUED | OUTPATIENT
Start: 2025-04-02 | End: 2025-04-02 | Stop reason: HOSPADM

## 2025-04-02 RX ORDER — BUPIVACAINE HYDROCHLORIDE AND EPINEPHRINE 2.5; 5 MG/ML; UG/ML
INJECTION, SOLUTION EPIDURAL; INFILTRATION; INTRACAUDAL; PERINEURAL AS NEEDED
Status: DISCONTINUED | OUTPATIENT
Start: 2025-04-02 | End: 2025-04-02 | Stop reason: HOSPADM

## 2025-04-02 RX ORDER — PROPOFOL 10 MG/ML
VIAL (ML) INTRAVENOUS AS NEEDED
Status: DISCONTINUED | OUTPATIENT
Start: 2025-04-02 | End: 2025-04-02 | Stop reason: SURG

## 2025-04-02 RX ORDER — HYDROMORPHONE HYDROCHLORIDE 1 MG/ML
0.5 INJECTION, SOLUTION INTRAMUSCULAR; INTRAVENOUS; SUBCUTANEOUS
Status: DISCONTINUED | OUTPATIENT
Start: 2025-04-02 | End: 2025-04-02 | Stop reason: HOSPADM

## 2025-04-02 RX ORDER — KETOROLAC TROMETHAMINE 30 MG/ML
INJECTION, SOLUTION INTRAMUSCULAR; INTRAVENOUS AS NEEDED
Status: DISCONTINUED | OUTPATIENT
Start: 2025-04-02 | End: 2025-04-02 | Stop reason: SURG

## 2025-04-02 RX ORDER — HYDROCODONE BITARTRATE AND ACETAMINOPHEN 5; 325 MG/1; MG/1
1 TABLET ORAL EVERY 4 HOURS PRN
Qty: 20 TABLET | Refills: 0 | Status: SHIPPED | OUTPATIENT
Start: 2025-04-02

## 2025-04-02 RX ORDER — MIDAZOLAM HYDROCHLORIDE 1 MG/ML
0.5 INJECTION, SOLUTION INTRAMUSCULAR; INTRAVENOUS
Status: DISCONTINUED | OUTPATIENT
Start: 2025-04-02 | End: 2025-04-02 | Stop reason: HOSPADM

## 2025-04-02 RX ORDER — METHYLPREDNISOLONE ACETATE 80 MG/ML
INJECTION, SUSPENSION INTRA-ARTICULAR; INTRALESIONAL; INTRAMUSCULAR; SOFT TISSUE AS NEEDED
Status: DISCONTINUED | OUTPATIENT
Start: 2025-04-02 | End: 2025-04-02 | Stop reason: HOSPADM

## 2025-04-02 RX ADMIN — ONDANSETRON 4 MG: 2 INJECTION, SOLUTION INTRAMUSCULAR; INTRAVENOUS at 13:05

## 2025-04-02 RX ADMIN — HYDROMORPHONE HYDROCHLORIDE 0.5 MG: 1 INJECTION, SOLUTION INTRAMUSCULAR; INTRAVENOUS; SUBCUTANEOUS at 12:33

## 2025-04-02 RX ADMIN — KETOROLAC TROMETHAMINE 30 MG: 30 INJECTION, SOLUTION INTRAMUSCULAR; INTRAVENOUS at 13:05

## 2025-04-02 RX ADMIN — SODIUM CHLORIDE, POTASSIUM CHLORIDE, SODIUM LACTATE AND CALCIUM CHLORIDE 9 ML/HR: 600; 310; 30; 20 INJECTION, SOLUTION INTRAVENOUS at 11:03

## 2025-04-02 RX ADMIN — MIDAZOLAM 1 MG: 1 INJECTION INTRAMUSCULAR; INTRAVENOUS at 11:41

## 2025-04-02 RX ADMIN — HYDROCODONE BITARTRATE AND ACETAMINOPHEN 1 TABLET: 5; 325 TABLET ORAL at 13:58

## 2025-04-02 RX ADMIN — CEFAZOLIN 2 G: 2 INJECTION, POWDER, FOR SOLUTION INTRAMUSCULAR; INTRAVENOUS at 12:22

## 2025-04-02 RX ADMIN — PROPOFOL 200 MG: 10 INJECTION, EMULSION INTRAVENOUS at 12:30

## 2025-04-02 RX ADMIN — HYDROMORPHONE HYDROCHLORIDE 0.5 MG: 1 INJECTION, SOLUTION INTRAMUSCULAR; INTRAVENOUS; SUBCUTANEOUS at 13:02

## 2025-04-02 RX ADMIN — DEXAMETHASONE SODIUM PHOSPHATE 8 MG: 4 INJECTION, SOLUTION INTRA-ARTICULAR; INTRALESIONAL; INTRAMUSCULAR; INTRAVENOUS; SOFT TISSUE at 12:33

## 2025-04-02 RX ADMIN — FENTANYL CITRATE 50 MCG: 50 INJECTION, SOLUTION INTRAMUSCULAR; INTRAVENOUS at 12:33

## 2025-04-02 RX ADMIN — LIDOCAINE HYDROCHLORIDE 80 MG: 20 INJECTION, SOLUTION INFILTRATION; PERINEURAL at 12:30

## 2025-04-02 NOTE — H&P
History & Physical       Patient: Mega Mckeon    Date of Admission: No admission date for patient encounter.    YOB: 1960    Medical Record Number: [id number]    Attending Physician: Leslee Garcia MD        Chief Complaints: Other tear of medial meniscus of right knee as current injury, subsequent encounter [S80.286D]  Loose body of right knee [M23.41]      History of Present Illness: This patient has had a several month history of right knee pain that is worsened over the last several weeks.  He has true mechanical symptoms of catching and locking he has an MRI which shows a medial and lateral meniscus tear as well as several loose bodies who presents for arthroscopy     Allergies: No Known Allergies    Medications:   Home Medications:  No current facility-administered medications on file prior to encounter.     Current Outpatient Medications on File Prior to Encounter   Medication Sig    acetaminophen (TYLENOL) 500 MG tablet Take 2 tablets by mouth Every 6 (Six) Hours As Needed for Mild Pain.    aspirin 81 MG tablet Take 1 tablet by mouth Daily. (Patient taking differently: Take 1 tablet by mouth Daily. PT HOLDING FOR 5 DAYS BEFORE SURGERY PER MD)    atorvastatin (LIPITOR) 80 MG tablet Take 1 tablet by mouth Daily.    ibuprofen (ADVIL,MOTRIN) 800 MG tablet Take 1 tablet by mouth 3 (Three) Times a Day. (Patient taking differently: Take 1 tablet by mouth 3 (Three) Times a Day. HOLD PRIOR TO SURG)    metoprolol tartrate (LOPRESSOR) 25 MG tablet TAKE 1/2 TABLET BY MOUTH DAILY     Current Medications:  Scheduled Meds:  Continuous Infusions:No current facility-administered medications for this encounter.    PRN Meds:.    Past Medical History:   Diagnosis Date    Arthritis     Atypical chest pain 11/04/2018    CAD (coronary artery disease)     History of kidney stones     History of measles     History of mumps     Hyperlipidemia     Hypertension     Ischemic cardiomyopathy     Myocardial  infarction     status: post drug eluting stent to the right coronary artery    Right knee pain     Tear of medial meniscus of right knee, current 03/20/2025    Tendinitis of knee 2017    Whooping cough         Past Surgical History:   Procedure Laterality Date    COLONOSCOPY  05/08/2015    Dr Stevens     CORONARY ANGIOPLASTY WITH STENT PLACEMENT  02/11/2011    [facility]    VASECTOMY  1994        Social History     Occupational History    Not on file   Tobacco Use    Smoking status: Never     Passive exposure: Never (parents smoked in the home)    Smokeless tobacco: Never    Tobacco comments:     caffeine use   Vaping Use    Vaping status: Never Used   Substance and Sexual Activity    Alcohol use: Yes     Alcohol/week: 2.0 standard drinks of alcohol     Types: 2 Cans of beer per week     Comment: 2-3 beers weekly    Drug use: No    Sexual activity: Yes     Partners: Female     Birth control/protection: Post-menopausal, Vasectomy      Social History     Social History Narrative    Self employed - works in concrete         Family History   Problem Relation Age of Onset    Diabetes Mother     Diabetes Father     Malig Hyperthermia Neg Hx        Review of Systems      Physical Exam: 65 y.o. male  General Appearance:    Alert, cooperative, in no acute distress                    There were no vitals filed for this visit.     Head:  Normocephalic, without obvious abnormality, atraumatic   Eyes:          Conjunctivae and sclerae normal, no pallor, corneas clear,    Ears:  Ears appear intact with no abnormalities noted   Throat: No oral lesions, no thrush, oral mucosa moist   Neck: No adenopathy, supple, trachea midline, no thyromegaly,    Back:   No kyphosis present, no scoliosis present, no skin lesions,      erythema or scars, no tenderness to percussion or                   palpation,range of motion normal   Lungs:   C respirations regular, even and                 unlabored    Heart:  Regular rhythm and normal rate                Chest Wall:  No abnormalities observed               Pulses: Pulses palpable and equal bilaterally   Skin: No bleeding, bruising or rash   Lymph nodes: No palpable adenopathy   Neurologic: Appears neurologic intact             Assessment:    Tear of medial meniscus of right knee, current    Loose body of right knee          Plan: All risks, benefits and alternatives were discussed.  Risks including but not exclusive to anesthetic complications, including death, MI, CVA, infection, bleeding DVT, PE,  fracture, residual pain and need for future surgery.  Patient understood all and agrees to proceed.

## 2025-04-02 NOTE — ANESTHESIA PREPROCEDURE EVALUATION
Anesthesia Evaluation     Patient summary reviewed and Nursing notes reviewed   NPO Solid Status: > 8 hours  NPO Liquid Status: > 2 hours           Airway   Mallampati: II  TM distance: >3 FB  Neck ROM: full  No difficulty expected  Dental - normal exam         Pulmonary - normal exam   (+) a smoker Former,  Cardiovascular - normal exam  Exercise tolerance: good (4-7 METS)    ECG reviewed  Patient on routine beta blocker and Beta blocker given within 24 hours of surgery    (+) hypertension, past MI  >12 months, CAD, cardiac stents more than 12 months ago , hyperlipidemia      Neuro/Psych- negative ROS  GI/Hepatic/Renal/Endo - negative ROS     Musculoskeletal     Abdominal    Substance History - negative use     OB/GYN          Other   arthritis,         Phys Exam Other: Top teeth caps as indicated            Anesthesia Plan    ASA 3     general     intravenous induction     Anesthetic plan, risks, benefits, and alternatives have been provided, discussed and informed consent has been obtained with: patient.    CODE STATUS:

## 2025-04-02 NOTE — OP NOTE
Operative Note      Facility: [facility]  Patient Name: Mega Mckeon  YOB: 1960  Date: 4/2/2025  Medical Record Number: [id number]      Pre-op Diagnosis:   Other tear of medial meniscus of right knee as current injury, subsequent encounter [S83.241D]  Loose body of right knee [M23.41]    Post-Op Diagnosis Codes:     * Other tear of medial meniscus of right knee as current injury, subsequent encounter [S83.241D]     * Loose body of right knee [M23.41]  Grade 4 changes patellofemoral joint grade 2 changes medial femoral condyle  Procedure(s):  KNEE ARTHROSCOPY right with partial medial meniscectomy and debridement of patellofemoral joint and medial femoral condyle    Surgeon(s):  Leslee Garcia MD    Anesthesia: General  Anesthesiologist: Cristiano Powell MD  CRNA: Reyna Paulino CRNA; Everette Page CRNA    Staff:   Circulator: Iveth Painter RN  Scrub Person: Tyesha Nails RN  Vendor Representative: Andreas Vargas    Assistants : Libertad Ball first assist, first assist was used breath case for proposition patient on table, position extremity or any procedure and closure    Estimated Blood Loss: 5 mL    Specimens:    none     Drains: None    Findings: See Dictation    Complications: None      Indication for procedure: This patient has had a several month history of knee pain and has an exam and an MRI which are consistent with meniscal pathology. They understand all options and wish to proceed with arthroscopy.      Description of procedure: The patient was taken to the operating room. They were placed supine on the operating room table. After induction of adequate LMA anesthesia, IV antibiotics the patient underwent exam under anesthesia with symmetric full range of motion. Nonsterile tourniquet was applied patient was placed in the thigh chirinos all prominent areas were well padded and end of the table dropped. The leg was prepped and draped in usual sterile fashion.  Standard lateral incision was made with 11 blade. Blunt trocar penetrated into the joint, scope followed and the evaluation began.  The patella.  Is essentially within the trochlear groove he had marked Ramos changes on both sides felt to be grade 4 throughout almost the entirety of the patella I then entered the medial compartment under spinal needle localization direct visualization a medial portal was established he did have a complex tear of the medial meniscus with a large radial component this was resected with various angles biters baskets motorized ananth ultimately Apollo device he did have several small cartilaginous loose bodies floating around neck these were from the patella the notch was normal lateral compartment was normal I then turned my attention the patella he had large flaps of cartilage that were loose these were resected with biters motorized ananth          At this point everything was thoroughly irrigated it was suctioned all 3 compartments, the gutters the suprapatellar pouch were all evaluated there was no further acute pathology seen.  Everything was thoroughly irrigated it was injected with Marcaine Depo-Medrol.  The portals were closed with 3-0 nylon interrupted fashion.  Sterile dressings and Ace wraps were applied.  The patient tolerated the procedure well and was taken to recovery room in good condition.  All sponge and needle counts were correct.                    Date: 4/2/2025  Time: 13:14 EDT

## 2025-04-02 NOTE — ANESTHESIA POSTPROCEDURE EVALUATION
Patient: Mega Mckeon    Procedure Summary       Date: 04/02/25 Room / Location:  [facility] [address] [place] [address] [facility]    Anesthesia Start: 1225 Anesthesia Stop: 1317    Procedure: KNEE ARTHROSCOPY (Right: Knee) Diagnosis:       Other tear of medial meniscus of right knee as current injury, subsequent encounter      Loose body of right knee      (Other tear of medial meniscus of right knee as current injury, subsequent encounter [S83.241D])      (Loose body of right knee [M23.41])    Surgeons: Leslee Garcia MD Provider: Cristiano Powell MD    Anesthesia Type: general ASA Status: 3            Anesthesia Type: general    Vitals  Vitals Value Taken Time   /94 04/02/25 13:47   Temp 36.5 °C (97.7 °F) 04/02/25 13:20   Pulse 82 04/02/25 13:54   Resp 16 04/02/25 13:45   SpO2 98 % 04/02/25 13:54   Vitals shown include unfiled device data.        Post Anesthesia Care and Evaluation    Patient location during evaluation: PACU  Level of consciousness: awake  Pain management: adequate    Airway patency: patent  Anesthetic complications: No anesthetic complications  PONV Status: controlled  Cardiovascular status: acceptable  Respiratory status: acceptable  Hydration status: acceptable    Comments: /94   Pulse 101   Temp 36.5 °C (97.7 °F) (Oral)   Resp 16   SpO2 97%

## 2025-04-02 NOTE — ANESTHESIA PROCEDURE NOTES
Airway  Reason: elective    Date/Time: 4/2/2025 12:31 PM  Airway not difficult    General Information and Staff    Patient location during procedure: OR  Anesthesiologist: Cristiano Powell MD  CRNA/CAA: Reyna Paulino CRNA    Indications and Patient Condition  Indications for airway management: airway protection    Preoxygenated: yes    Mask difficulty assessment: 0 - not attempted    Final Airway Details    Final airway type: supraglottic airway      Successful airway: classic  Size: 4   Number of attempts at approach: 1  Assessment: lips, teeth, and gum same as pre-op

## 2025-04-15 ENCOUNTER — OFFICE VISIT (OUTPATIENT)
Dept: ORTHOPEDIC SURGERY | Facility: CLINIC | Age: 65
End: 2025-04-15
Payer: COMMERCIAL

## 2025-04-15 VITALS — WEIGHT: 178.4 LBS | TEMPERATURE: 97.2 F | BODY MASS INDEX: 28 KG/M2 | HEIGHT: 67 IN

## 2025-04-15 DIAGNOSIS — Z98.890 S/P ARTHROSCOPY OF KNEE: Primary | ICD-10-CM

## 2025-04-15 PROCEDURE — 99024 POSTOP FOLLOW-UP VISIT: CPT | Performed by: ORTHOPAEDIC SURGERY

## 2025-05-07 NOTE — PROGRESS NOTES
Knee Scope follow Up       Patient: Mega Mckeon        YOB: 1960      Chief Complaints: knee painRight      History of Present Illness: Pt is here f/u knee arthroscopy of the right knee he states is not doing well it is still sore.  I asked him about his activities he does admit that he probably did too much too soon and is doing too much on it.  He never did physical therapy thought he could do it on his own but I do think his quads are much weaker than the left side that is probably adding into it        Allergies: No Known Allergies    Medications:   Home Medications:  Current Outpatient Medications on File Prior to Visit   Medication Sig    acetaminophen (TYLENOL) 500 MG tablet Take 2 tablets by mouth Every 6 (Six) Hours As Needed for Mild Pain.    aspirin 81 MG tablet Take 1 tablet by mouth Daily. (Patient taking differently: Take 1 tablet by mouth Daily. PT HOLDING FOR 5 DAYS BEFORE SURGERY PER MD)    atorvastatin (LIPITOR) 80 MG tablet Take 1 tablet by mouth Daily.    HYDROcodone-acetaminophen (NORCO) 5-325 MG per tablet Take 1 tablet by mouth Every 4 (Four) Hours As Needed for Severe Pain. (Patient not taking: Reported on 4/15/2025)    ibuprofen (ADVIL,MOTRIN) 800 MG tablet Take 1 tablet by mouth 3 (Three) Times a Day. (Patient taking differently: Take 1 tablet by mouth 3 (Three) Times a Day. HOLD PRIOR TO SURG)    metoprolol tartrate (LOPRESSOR) 25 MG tablet TAKE 1/2 TABLET BY MOUTH DAILY     No current facility-administered medications on file prior to visit.     Current Medications:  Scheduled Meds:  Continuous Infusions:No current facility-administered medications for this visit.    PRN Meds:.          Physical Exam: 65 y.o. male  General Appearance:    Alert, cooperative, in no acute distress                 There were no vitals filed for this visit.   Patient is alert and oriented ×3 no acute distress normal mood physical exam.  Physical exam of the knee, incisions looked good  there is no erythema, calf is soft and non-tender.  No sign or sx of DVT  He has no effusion no redness he has good range of motion his quads are weak compared to the other side    Assessment  S/P knee scope.  He is symptomatic I suspect he is done too much too soon also suspect he is weaker I would insist now that he do physical therapy I will also start him onMeloxicam with strict precautions for short period of time it is too soon to inject him again I want him to try to back off his activity some        Plan: Continue with strengthening, progression of activities

## 2025-05-13 ENCOUNTER — OFFICE VISIT (OUTPATIENT)
Dept: ORTHOPEDIC SURGERY | Facility: CLINIC | Age: 65
End: 2025-05-13
Payer: COMMERCIAL

## 2025-05-13 VITALS — TEMPERATURE: 97.1 F | BODY MASS INDEX: 27.73 KG/M2 | WEIGHT: 176.7 LBS | HEIGHT: 67 IN

## 2025-05-13 DIAGNOSIS — Z98.890 S/P ARTHROSCOPY OF KNEE: Primary | ICD-10-CM

## 2025-05-13 RX ORDER — MELOXICAM 15 MG/1
TABLET ORAL
Qty: 30 TABLET | Refills: 0 | Status: SHIPPED | OUTPATIENT
Start: 2025-05-13

## 2025-05-14 NOTE — PROGRESS NOTES
Knee Scope follow Up       Patient: Mega Mckeon        YOB: 1960      Chief Complaints: knee pain right      History of Present Illness: Pt is here f/u knee arthroscopy on the right he states he doing much better he has seen physical therapy feels like he is gotten stronger gets a little stiff if he for stands up but overall much improved        Allergies: No Known Allergies    Medications:   Home Medications:  Current Outpatient Medications on File Prior to Visit   Medication Sig    acetaminophen (TYLENOL) 500 MG tablet Take 2 tablets by mouth Every 6 (Six) Hours As Needed for Mild Pain.    aspirin 81 MG tablet Take 1 tablet by mouth Daily. (Patient taking differently: Take 1 tablet by mouth Daily. PT HOLDING FOR 5 DAYS BEFORE SURGERY PER MD)    atorvastatin (LIPITOR) 80 MG tablet Take 1 tablet by mouth Daily.    HYDROcodone-acetaminophen (NORCO) 5-325 MG per tablet Take 1 tablet by mouth Every 4 (Four) Hours As Needed for Severe Pain. (Patient not taking: Reported on 5/13/2025)    ibuprofen (ADVIL,MOTRIN) 800 MG tablet Take 1 tablet by mouth 3 (Three) Times a Day. (Patient taking differently: Take 1 tablet by mouth 3 (Three) Times a Day. HOLD PRIOR TO SURG)    meloxicam (MOBIC) 15 MG tablet 1 PO Daily with food.    metoprolol tartrate (LOPRESSOR) 25 MG tablet TAKE 1/2 TABLET BY MOUTH DAILY     No current facility-administered medications on file prior to visit.     Current Medications:  Scheduled Meds:  Continuous Infusions:No current facility-administered medications for this visit.    PRN Meds:.          Physical Exam: 65 y.o. male  General Appearance:    Alert, cooperative, in no acute distress                 There were no vitals filed for this visit.   Patient is alert and oriented ×3 no acute distress normal mood physical exam.  Physical exam of the knee, incisions looked good there is no erythema, calf is soft and non-tender.  No sign or sx of DVT      Assessment  S/P knee scope.   Overall doing well.  I think he has improved with physical therapy.  In looking at his scope pictures he did have a fair amount of arthritis I think that is what is going to bother him going forward is too early to inject it but he could make an appointment for mid July if he is symptomatic at that time I would injected also        Plan: Continue with strengthening, progression of activities I do think he is better he can make an appointment for mid July for an injection if he is doing well he can cancel

## 2025-06-16 ENCOUNTER — OFFICE VISIT (OUTPATIENT)
Dept: ORTHOPEDIC SURGERY | Facility: CLINIC | Age: 65
End: 2025-06-16
Payer: COMMERCIAL

## 2025-06-16 VITALS — TEMPERATURE: 98 F | WEIGHT: 180 LBS | HEIGHT: 67 IN | BODY MASS INDEX: 28.25 KG/M2

## 2025-06-16 DIAGNOSIS — Z98.890 S/P ARTHROSCOPY OF KNEE: Primary | ICD-10-CM

## 2025-08-08 ENCOUNTER — OFFICE VISIT (OUTPATIENT)
Dept: ORTHOPEDIC SURGERY | Facility: CLINIC | Age: 65
End: 2025-08-08
Payer: COMMERCIAL

## 2025-08-08 VITALS — WEIGHT: 173 LBS | TEMPERATURE: 98.3 F | BODY MASS INDEX: 27.15 KG/M2 | HEIGHT: 67 IN

## 2025-08-08 DIAGNOSIS — Z98.890 STATUS POST ARTHROSCOPY OF KNEE: Primary | ICD-10-CM

## 2025-08-08 RX ORDER — LIDOCAINE HYDROCHLORIDE 10 MG/ML
2 INJECTION, SOLUTION EPIDURAL; INFILTRATION; INTRACAUDAL; PERINEURAL
Status: COMPLETED | OUTPATIENT
Start: 2025-08-08 | End: 2025-08-08

## 2025-08-08 RX ORDER — METHYLPREDNISOLONE ACETATE 80 MG/ML
80 INJECTION, SUSPENSION INTRA-ARTICULAR; INTRALESIONAL; INTRAMUSCULAR; SOFT TISSUE
Status: COMPLETED | OUTPATIENT
Start: 2025-08-08 | End: 2025-08-08

## 2025-08-08 RX ADMIN — LIDOCAINE HYDROCHLORIDE 2 ML: 10 INJECTION, SOLUTION EPIDURAL; INFILTRATION; INTRACAUDAL; PERINEURAL at 08:30

## 2025-08-08 RX ADMIN — METHYLPREDNISOLONE ACETATE 80 MG: 80 INJECTION, SUSPENSION INTRA-ARTICULAR; INTRALESIONAL; INTRAMUSCULAR; SOFT TISSUE at 08:30

## (undated) DEVICE — DRSNG WND GZ CURAD OIL EMULSION 3X3IN STRL

## (undated) DEVICE — GLV SURG BIOGEL LTX PF 6 1/2

## (undated) DEVICE — BNDG,ELSTC,MATRIX,STRL,4"X5YD,LF,HOOK&LP: Brand: MEDLINE

## (undated) DEVICE — UNDERCAST PADDING: Brand: DEROYAL

## (undated) DEVICE — BLD DISSCT COOL CUT SJ CRVD 4MM 13CM

## (undated) DEVICE — SUT ETHLN 3/0 PS1 18IN 1663H

## (undated) DEVICE — PROB ABL APOLLORF MP50 ASP 50DEG

## (undated) DEVICE — PK KN ARTHROSCOPY 50

## (undated) DEVICE — SKIN PREP TRAY W/CHG: Brand: MEDLINE INDUSTRIES, INC.